# Patient Record
Sex: FEMALE | Race: WHITE | NOT HISPANIC OR LATINO | Employment: FULL TIME | URBAN - METROPOLITAN AREA
[De-identification: names, ages, dates, MRNs, and addresses within clinical notes are randomized per-mention and may not be internally consistent; named-entity substitution may affect disease eponyms.]

---

## 2023-03-16 DIAGNOSIS — J45.909 UNSPECIFIED ASTHMA, UNCOMPLICATED (HHS-HCC): ICD-10-CM

## 2023-03-16 RX ORDER — MONTELUKAST SODIUM 10 MG/1
10 TABLET ORAL EVERY EVENING
COMMUNITY
End: 2023-10-24 | Stop reason: SDUPTHER

## 2023-03-16 NOTE — TELEPHONE ENCOUNTER
Requested Prescriptions     Pending Prescriptions Disp Refills    montelukast (Singulair) 10 mg tablet [Pharmacy Med Name: MONTELUKAST SOD 10 MG TABLET] 90 tablet      Sig: TAKE 1 TABLET BY MOUTH EVERY DAY IN THE EVENING

## 2023-03-17 RX ORDER — MONTELUKAST SODIUM 10 MG/1
TABLET ORAL
Qty: 30 TABLET | Refills: 0 | Status: SHIPPED | OUTPATIENT
Start: 2023-03-17 | End: 2023-04-14

## 2023-03-21 RX ORDER — MONTELUKAST SODIUM 10 MG/1
10 TABLET ORAL EVERY EVENING
Qty: 30 TABLET | Refills: 1 | OUTPATIENT
Start: 2023-03-21 | End: 2023-04-20

## 2023-03-21 NOTE — TELEPHONE ENCOUNTER
It seems this medication was sent on 3/17/23 by Dr. Bradshaw. Please confirm with patient. If she is unable to fill that prescription, I can send in a new one. Thanks!

## 2023-04-14 DIAGNOSIS — J45.909 UNSPECIFIED ASTHMA, UNCOMPLICATED (HHS-HCC): ICD-10-CM

## 2023-04-14 RX ORDER — MONTELUKAST SODIUM 10 MG/1
TABLET ORAL
Qty: 30 TABLET | Refills: 0 | Status: SHIPPED | OUTPATIENT
Start: 2023-04-14 | End: 2023-05-08

## 2023-04-14 NOTE — TELEPHONE ENCOUNTER
Requested Prescriptions     Pending Prescriptions Disp Refills    montelukast (Singulair) 10 mg tablet [Pharmacy Med Name: MONTELUKAST SOD 10 MG TABLET] 30 tablet 0     Sig: TAKE 1 TABLET BY MOUTH EVERY DAY IN THE EVENING

## 2023-05-06 DIAGNOSIS — J45.909 UNSPECIFIED ASTHMA, UNCOMPLICATED (HHS-HCC): ICD-10-CM

## 2023-05-08 RX ORDER — MONTELUKAST SODIUM 10 MG/1
TABLET ORAL
Qty: 30 TABLET | Refills: 0 | Status: SHIPPED | OUTPATIENT
Start: 2023-05-08 | End: 2023-06-06

## 2023-06-03 DIAGNOSIS — J45.909 UNSPECIFIED ASTHMA, UNCOMPLICATED (HHS-HCC): ICD-10-CM

## 2023-06-06 RX ORDER — MONTELUKAST SODIUM 10 MG/1
TABLET ORAL
Qty: 30 TABLET | Refills: 0 | Status: SHIPPED | OUTPATIENT
Start: 2023-06-06 | End: 2023-07-10

## 2023-08-09 DIAGNOSIS — K21.9 GASTROESOPHAGEAL REFLUX DISEASE WITHOUT ESOPHAGITIS: Primary | ICD-10-CM

## 2023-08-09 RX ORDER — OMEPRAZOLE 20 MG/1
20 CAPSULE, DELAYED RELEASE ORAL DAILY
Qty: 30 CAPSULE | Refills: 1 | Status: SHIPPED | OUTPATIENT
Start: 2023-08-09 | End: 2023-09-18 | Stop reason: SDUPTHER

## 2023-08-24 ENCOUNTER — TELEPHONE (OUTPATIENT)
Dept: PRIMARY CARE | Facility: CLINIC | Age: 23
End: 2023-08-24
Payer: COMMERCIAL

## 2023-08-24 NOTE — TELEPHONE ENCOUNTER
Pt's mother called because pt was having some discomfort and hard to breathe and requested an inhaler be sent into pharmacy near them (they are in [place]). After speaking with you, i called and had to leave a message for them to call us back. Awaiting call back.

## 2023-08-29 ENCOUNTER — OFFICE VISIT (OUTPATIENT)
Dept: PRIMARY CARE | Facility: CLINIC | Age: 23
End: 2023-08-29
Payer: COMMERCIAL

## 2023-08-29 VITALS
BODY MASS INDEX: 43.58 KG/M2 | OXYGEN SATURATION: 97 % | TEMPERATURE: 98 F | HEART RATE: 112 BPM | WEIGHT: 246 LBS | RESPIRATION RATE: 18 BRPM

## 2023-08-29 DIAGNOSIS — J20.9 ACUTE BRONCHITIS, UNSPECIFIED ORGANISM: Primary | ICD-10-CM

## 2023-08-29 PROCEDURE — 99214 OFFICE O/P EST MOD 30 MIN: CPT | Performed by: FAMILY MEDICINE

## 2023-08-29 PROCEDURE — 87635 SARS-COV-2 COVID-19 AMP PRB: CPT

## 2023-08-29 RX ORDER — DEXAMETHASONE 6 MG/1
6 TABLET ORAL DAILY
Qty: 10 TABLET | Refills: 0 | Status: SHIPPED | OUTPATIENT
Start: 2023-08-29 | End: 2023-10-24 | Stop reason: ALTCHOICE

## 2023-08-29 RX ORDER — BENZONATATE 200 MG/1
200 CAPSULE ORAL 3 TIMES DAILY PRN
Qty: 30 CAPSULE | Refills: 0 | Status: SHIPPED | OUTPATIENT
Start: 2023-08-29 | End: 2023-09-08

## 2023-08-29 RX ORDER — AMOXICILLIN AND CLAVULANATE POTASSIUM 875; 125 MG/1; MG/1
875 TABLET, FILM COATED ORAL 2 TIMES DAILY
Qty: 20 TABLET | Refills: 0 | Status: SHIPPED | OUTPATIENT
Start: 2023-08-29 | End: 2023-09-08

## 2023-08-29 NOTE — PROGRESS NOTES
Subjective   Patient ID: Xochitl Banuelos is a 22 y.o. female who presents for Cough (Congestion and wheezing started on Monday last week. Was on vacation and went to an urgent care. Was put on ATB and steroid and cough suppressant but cough got worse. ).    HPI     Review of Systems    Objective   Pulse (!) 112   Temp 36.7 °C (98 °F)   Resp 18   Wt 112 kg (246 lb)   LMP 08/16/2023 (Approximate)   SpO2 97%   BMI 43.58 kg/m²     Physical Exam    Assessment/Plan

## 2023-08-29 NOTE — PROGRESS NOTES
"Subjective   Patient ID: Xochitl Banuelos is a 22 y.o. female who presents for Cough (Congestion and wheezing started on Monday last week. Was on vacation and went to an urgent care. Was put on ATB and steroid and cough suppressant but cough got worse. ).    HPI   Patient comes in today for evaluation of cough, congestion and wheezing.  She was vacationing in [place] last week and became sick.  She went to an urgent care in [place] and was given steroids, Z-Dj and Tessalon Perles.  She tested for COVID last Thursday and was negative and tested again on Sunday and was negative.  She quit vaping on Saturday.  She has completed the course of medication she was given but still is not feeling better.        1/11/2022  Patient presents today for 6-month checkup and refill of meds. She currently is without complaints. She states that she is taking her medicines as directed and is not having any side effects from them. Patient continues to do well with the medication and has lost another 4 pounds since last visit. 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    Patient has had a cough for several days.    5/17/2021  Patient presents today for 6-month checkup and refill of meds. She currently is without complaints. She states that she is taking her medicines as directed and is not having any side effects from them. The new medicine is working much better for her. She has lost 6 pounds since she was here last and is hoping to lose more.    11/11/2020  Patient comes in today for reevaluation of her medications. She would like to try changing medicines. She doesn't feel like it's helping her depression. She states she is not interested in doing anything that she usually likes. She has a feeling of hopelessness and is \"crying all the time\". All of this has been going on for the past month or so. She denies any triggering episode. She has also gained over 100 pounds in the last 3 years. We had a " "lengthy discussion about all this today.    Patient also needs an update of her Tdap for her clinicals for school.    8/7/2020  Patient comes in today for follow-up on her antidepressant medication. It is working well for her. 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 She is hoping to start clinicals here in the fall. She will be doing them at [facility] [employer]. She has gained 2 pounds since she was here in February. She really hasn't been able to exercise or do much because of the coronavirus. She is hoping to get a lot more exercise with her clinicals coming up.    2/13/2020  Patient comes in today by herself for follow-up on her depression. She states that she is \"feeling better\". Her weight has stabilized at 268 where it has been now for the past 6 weeks. That is still up 100 pounds from 2 years ago. Since she was here last we did see some lab work and pelvic ultrasound which was pretty much normal except for a slight elevation of the white count to 12,400 and slight testosterone elevation to 72.    1/2/2020  Patient comes in today with her mother for follow-up on her depression. She is feeling more sad and with mood swings. She is taking the sertraline but only 25 mg daily. She eats for comfort measures and has gained significant amount weight. She is up 100 pounds from 2 years ago. Her mother is getting her into counseling. I expressed my concern about her weight and her depression and recommended close follow-up in a month. She hasn't been here since May at which time she weighed 220 pounds. She has gained 48 pounds since then.    5/1/2019  The patient is a 18 year old female who presents with a complaint of a sore throat. The sore throat has been occurring for 1 week. There has been associated sore throat (with white spots on the back of the throat), while there has been no loss of appetite, fever, chills, ear pain, runny nose, nasal congestion, headache, cough, " wheezing, nausea, vomiting, diarrhea, body aches, hoarseness, recent contact with a person with sore throat, sinus pain, skin rash, swollen lymph glands or other. It is aggravated by swallowing. There are no relieving factors.    Additional reasons for visit:    Vaginal discharge is described as the following:  The discharge has been occurring for 1 day. The discharge has been scant and is characterized as milky.     Review of Systems   All other systems reviewed and are negative.      Objective   Pulse (!) 112   Temp 36.7 °C (98 °F)   Resp 18   Wt 112 kg (246 lb)   LMP 08/16/2023 (Approximate)   SpO2 97%   BMI 43.58 kg/m²     Physical Exam  Vitals reviewed.   Constitutional:       Appearance: She is well-developed.   HENT:      Head: Normocephalic and atraumatic.      Right Ear: Tympanic membrane, ear canal and external ear normal.      Left Ear: Tympanic membrane, ear canal and external ear normal.      Nose: Nose normal.      Mouth/Throat:      Mouth: Mucous membranes are moist.      Pharynx: Oropharynx is clear.   Eyes:      Extraocular Movements: Extraocular movements intact.      Conjunctiva/sclera: Conjunctivae normal.      Pupils: Pupils are equal, round, and reactive to light.   Cardiovascular:      Rate and Rhythm: Normal rate and regular rhythm.      Heart sounds: Normal heart sounds. No murmur heard.  Pulmonary:      Effort: Pulmonary effort is normal.      Breath sounds: Wheezing and rhonchi (Coarse scattered rhonchi bilateral lung fields with wheeze) present.   Abdominal:      General: Abdomen is flat. Bowel sounds are normal.      Palpations: Abdomen is soft.   Musculoskeletal:         General: Normal range of motion.   Skin:     General: Skin is warm and dry.   Neurological:      General: No focal deficit present.      Mental Status: She is alert and oriented to person, place, and time. Mental status is at baseline.   Psychiatric:         Mood and Affect: Mood normal.         Behavior: Behavior  normal.         Thought Content: Thought content normal.         Judgment: Judgment normal.         Assessment/Plan   Problem List Items Addressed This Visit    None  Visit Diagnoses       Acute bronchitis, unspecified organism    -  Primary    Relevant Medications    amoxicillin-pot clavulanate (Augmentin) 875-125 mg tablet    dexAMETHasone (Decadron) 6 mg tablet    benzonatate (Tessalon) 200 mg capsule    Other Relevant Orders    XR chest 2 views    Sars-CoV-2 PCR, Symptomatic (Completed)        Use meds as directed.  Return to clinic if symptoms do not improve in 10-14 days.    Should the condition worsen contact me and return here or if after hours seek further evaluation at an urgent care or in the emergency room.  Medication list reconciled.  Thank you for visiting today!      Professional services: Some of this note was completed using Dragon voice technology and sometimes the software misinterprets words. This may include unintended errors with respect to translation of words, typographical errors or grammar errors which may not have been identified prior to finalization of the chart note. Please take this into account when reading the note. Thank you.

## 2023-08-30 LAB — SARS-COV-2 RESULT: NOT DETECTED

## 2023-09-01 ENCOUNTER — TELEPHONE (OUTPATIENT)
Dept: PRIMARY CARE | Facility: CLINIC | Age: 23
End: 2023-09-01
Payer: COMMERCIAL

## 2023-09-06 DIAGNOSIS — J45.20 MILD INTERMITTENT ASTHMA, UNSPECIFIED WHETHER COMPLICATED (HHS-HCC): Primary | ICD-10-CM

## 2023-09-06 RX ORDER — ALBUTEROL SULFATE 90 UG/1
2 AEROSOL, METERED RESPIRATORY (INHALATION)
Qty: 18 G | Refills: 3 | Status: SHIPPED | OUTPATIENT
Start: 2023-09-06

## 2023-09-06 RX ORDER — ALBUTEROL SULFATE 90 UG/1
2 AEROSOL, METERED RESPIRATORY (INHALATION)
COMMUNITY
Start: 2015-11-05 | End: 2023-09-06 | Stop reason: SDUPTHER

## 2023-09-06 NOTE — TELEPHONE ENCOUNTER
Requested Prescriptions     Pending Prescriptions Disp Refills    albuterol 90 mcg/actuation inhaler 18 g 3     Sig: Inhale 2 puffs 4 times a day.

## 2023-09-08 DIAGNOSIS — J45.909 UNSPECIFIED ASTHMA, UNCOMPLICATED (HHS-HCC): ICD-10-CM

## 2023-09-08 DIAGNOSIS — K21.9 GASTROESOPHAGEAL REFLUX DISEASE WITHOUT ESOPHAGITIS: ICD-10-CM

## 2023-09-08 NOTE — TELEPHONE ENCOUNTER
Requested Prescriptions     Pending Prescriptions Disp Refills    montelukast (Singulair) 10 mg tablet [Pharmacy Med Name: MONTELUKAST SOD 10 MG TABLET] 90 tablet 1     Sig: Take 1 tablet (10 mg) by mouth once daily in the evening.    omeprazole (PriLOSEC) 20 mg DR capsule 90 capsule 1     Sig: Take 1 capsule (20 mg) by mouth once daily. Do not crush or chew.

## 2023-09-18 RX ORDER — MONTELUKAST SODIUM 10 MG/1
10 TABLET ORAL EVERY EVENING
Qty: 90 TABLET | Refills: 1 | Status: SHIPPED | OUTPATIENT
Start: 2023-09-18 | End: 2024-03-20

## 2023-09-18 RX ORDER — OMEPRAZOLE 20 MG/1
20 CAPSULE, DELAYED RELEASE ORAL DAILY
Qty: 90 CAPSULE | Refills: 1 | Status: SHIPPED | OUTPATIENT
Start: 2023-09-18 | End: 2024-03-20

## 2023-09-30 PROBLEM — F41.9 ANXIETY: Status: ACTIVE | Noted: 2023-09-30

## 2023-09-30 PROBLEM — E55.9 VITAMIN D DEFICIENCY: Status: ACTIVE | Noted: 2023-09-30

## 2023-09-30 PROBLEM — J35.8 TONSIL STONE: Status: ACTIVE | Noted: 2023-09-30

## 2023-09-30 PROBLEM — D72.829 ELEVATED WHITE BLOOD CELL COUNT: Status: ACTIVE | Noted: 2023-09-30

## 2023-09-30 PROBLEM — E03.9 HYPOTHYROIDISM: Status: ACTIVE | Noted: 2023-09-30

## 2023-09-30 PROBLEM — E66.01 MORBID OBESITY (MULTI): Status: ACTIVE | Noted: 2023-09-30

## 2023-09-30 PROBLEM — R09.A2 GLOBUS PHARYNGEUS: Status: ACTIVE | Noted: 2023-09-30

## 2023-09-30 PROBLEM — R00.2 PALPITATIONS: Status: ACTIVE | Noted: 2023-09-30

## 2023-09-30 PROBLEM — R76.11 PPD+ (PURIFIED PROTEIN DERIVATIVE POSITIVE): Status: ACTIVE | Noted: 2023-09-30

## 2023-09-30 PROBLEM — R63.5 UNEXPLAINED WEIGHT GAIN: Status: ACTIVE | Noted: 2023-09-30

## 2023-09-30 PROBLEM — K21.9 ACID REFLUX: Status: ACTIVE | Noted: 2023-09-30

## 2023-09-30 PROBLEM — R79.89 ELEVATED TSH: Status: ACTIVE | Noted: 2023-09-30

## 2023-09-30 PROBLEM — R13.10 DYSPHAGIA: Status: ACTIVE | Noted: 2023-09-30

## 2023-09-30 PROBLEM — K21.9 GERD (GASTROESOPHAGEAL REFLUX DISEASE): Status: ACTIVE | Noted: 2023-09-30

## 2023-09-30 PROBLEM — F32.A DEPRESSION: Status: ACTIVE | Noted: 2023-09-30

## 2023-09-30 PROBLEM — N64.3 BILATERAL GALACTORRHEA: Status: ACTIVE | Noted: 2023-05-31

## 2023-09-30 PROBLEM — R35.0 URINARY FREQUENCY: Status: ACTIVE | Noted: 2023-09-30

## 2023-09-30 PROBLEM — R53.83 FATIGUE: Status: ACTIVE | Noted: 2023-09-30

## 2023-09-30 PROBLEM — J45.909 ASTHMA (HHS-HCC): Status: ACTIVE | Noted: 2023-09-30

## 2023-09-30 PROBLEM — E05.90 HYPERTHYROIDISM: Status: ACTIVE | Noted: 2023-09-30

## 2023-09-30 RX ORDER — TOPIRAMATE 50 MG/1
50 TABLET, FILM COATED ORAL DAILY
COMMUNITY
Start: 2023-08-30

## 2023-09-30 RX ORDER — PROMETHAZINE HYDROCHLORIDE AND DEXTROMETHORPHAN HYDROBROMIDE 6.25; 15 MG/5ML; MG/5ML
5 SYRUP ORAL EVERY 6 HOURS PRN
COMMUNITY
Start: 2023-08-24 | End: 2023-10-24 | Stop reason: ALTCHOICE

## 2023-09-30 RX ORDER — MELOXICAM 15 MG/1
1 TABLET ORAL DAILY
COMMUNITY
Start: 2022-11-24 | End: 2023-10-24 | Stop reason: ALTCHOICE

## 2023-09-30 RX ORDER — GUAIFENESIN, PSEUDOEPHEDRINE HYDROCHLORIDE 600; 60 MG/1; MG/1
1-2 TABLET, EXTENDED RELEASE ORAL EVERY 12 HOURS PRN
COMMUNITY
Start: 2023-08-24 | End: 2023-10-24 | Stop reason: ALTCHOICE

## 2023-09-30 RX ORDER — ERGOCALCIFEROL 1.25 MG/1
1 CAPSULE ORAL
COMMUNITY
Start: 2023-04-17 | End: 2023-10-24 | Stop reason: ALTCHOICE

## 2023-09-30 RX ORDER — NORGESTIMATE AND ETHINYL ESTRADIOL 7DAYSX3 28
1 KIT ORAL DAILY
COMMUNITY
Start: 2020-04-14 | End: 2023-10-24 | Stop reason: ALTCHOICE

## 2023-09-30 RX ORDER — PHENTERMINE HYDROCHLORIDE 37.5 MG/1
37.5 TABLET ORAL DAILY
COMMUNITY
Start: 2023-05-22 | End: 2023-10-24 | Stop reason: ALTCHOICE

## 2023-09-30 RX ORDER — DOCUSATE SODIUM 100 MG/1
100 CAPSULE, LIQUID FILLED ORAL 2 TIMES DAILY PRN
COMMUNITY
Start: 2023-04-20 | End: 2023-10-24 | Stop reason: ALTCHOICE

## 2023-09-30 RX ORDER — FLUOXETINE HYDROCHLORIDE 20 MG/1
1 CAPSULE ORAL EVERY MORNING
COMMUNITY
Start: 2020-12-06 | End: 2023-10-24 | Stop reason: ALTCHOICE

## 2023-09-30 RX ORDER — METFORMIN HYDROCHLORIDE 500 MG/1
1 TABLET ORAL 2 TIMES DAILY
COMMUNITY
Start: 2023-02-06 | End: 2023-10-24 | Stop reason: ALTCHOICE

## 2023-09-30 RX ORDER — CHOLECALCIFEROL (VITAMIN D3) 25 MCG
25 TABLET ORAL DAILY
COMMUNITY
Start: 2023-05-19 | End: 2023-11-15

## 2023-10-03 ENCOUNTER — DOCUMENTATION (OUTPATIENT)
Dept: PULMONOLOGY | Facility: CLINIC | Age: 23
End: 2023-10-03
Payer: COMMERCIAL

## 2023-10-03 ENCOUNTER — TELEPHONE (OUTPATIENT)
Dept: PULMONOLOGY | Facility: CLINIC | Age: 23
End: 2023-10-03
Payer: COMMERCIAL

## 2023-10-03 NOTE — TELEPHONE ENCOUNTER
Name: Xochitl Banuelos  MRN: [id number]  YOB: 2000    Date: 10/03/23     Reason for Visit:  Reschedule appointment     Appointment scheduled for 10/5/23 with Best Bronson CNP  Needs to be rescheduled to a different provider or a different location for Best.

## 2023-10-05 ENCOUNTER — APPOINTMENT (OUTPATIENT)
Dept: PULMONOLOGY | Facility: HOSPITAL | Age: 23
End: 2023-10-05
Payer: COMMERCIAL

## 2023-10-24 ENCOUNTER — OFFICE VISIT (OUTPATIENT)
Dept: PULMONOLOGY | Facility: CLINIC | Age: 23
End: 2023-10-24
Payer: COMMERCIAL

## 2023-10-24 VITALS
SYSTOLIC BLOOD PRESSURE: 125 MMHG | TEMPERATURE: 98.1 F | OXYGEN SATURATION: 99 % | HEIGHT: 63 IN | HEART RATE: 97 BPM | WEIGHT: 264 LBS | BODY MASS INDEX: 46.78 KG/M2 | DIASTOLIC BLOOD PRESSURE: 91 MMHG

## 2023-10-24 DIAGNOSIS — R06.83 SNORING: ICD-10-CM

## 2023-10-24 DIAGNOSIS — J45.909 ASTHMA, UNSPECIFIED ASTHMA SEVERITY, UNSPECIFIED WHETHER COMPLICATED, UNSPECIFIED WHETHER PERSISTENT (HHS-HCC): Primary | ICD-10-CM

## 2023-10-24 DIAGNOSIS — J30.9 ALLERGIC RHINITIS, UNSPECIFIED SEASONALITY, UNSPECIFIED TRIGGER: ICD-10-CM

## 2023-10-24 DIAGNOSIS — R06.02 SHORTNESS OF BREATH: ICD-10-CM

## 2023-10-24 PROCEDURE — 99406 BEHAV CHNG SMOKING 3-10 MIN: CPT | Performed by: NURSE PRACTITIONER

## 2023-10-24 PROCEDURE — 99204 OFFICE O/P NEW MOD 45 MIN: CPT | Performed by: NURSE PRACTITIONER

## 2023-10-24 PROCEDURE — 99214 OFFICE O/P EST MOD 30 MIN: CPT | Performed by: NURSE PRACTITIONER

## 2023-10-24 RX ORDER — LORATADINE 10 MG/1
10 TABLET ORAL DAILY
Qty: 30 TABLET | Refills: 3 | Status: SHIPPED | OUTPATIENT
Start: 2023-10-24 | End: 2024-03-06

## 2023-10-24 RX ORDER — BUDESONIDE AND FORMOTEROL FUMARATE DIHYDRATE 80; 4.5 UG/1; UG/1
2 AEROSOL RESPIRATORY (INHALATION)
Qty: 1 EACH | Refills: 3 | Status: SHIPPED | OUTPATIENT
Start: 2023-10-24 | End: 2024-02-13 | Stop reason: ALTCHOICE

## 2023-10-24 ASSESSMENT — ENCOUNTER SYMPTOMS
SINUS PRESSURE: 0
BACK PAIN: 0
NUMBNESS: 0
ABDOMINAL PAIN: 0
VOICE CHANGE: 0
HEADACHES: 0
DIZZINESS: 0
NERVOUS/ANXIOUS: 0
EYE PAIN: 0
FEVER: 0
DIARRHEA: 0
ARTHRALGIAS: 0
JOINT SWELLING: 0
PALPITATIONS: 0
NAUSEA: 0
AGITATION: 0
WEAKNESS: 0
DEPRESSION: 0
VOMITING: 0
MYALGIAS: 0
FATIGUE: 1
RHINORRHEA: 0

## 2023-10-24 NOTE — PATIENT INSTRUCTIONS
Asthma:   - will get baseline PFTs - henry pre/post and FENO   - continue albuterol 2 puffs or albuterol nebulizers every 4-6 hours as needed for shortness of breath   - start symbicort 80/4.5 2 puffs twice daily - rinse mouth out afterwards     Allergic rhinitis:   - continue montelukast (singulair) 10mg daily at bedtime   - start loratadine (claritin) 10mg daily --> wait 2 weeks     Snoring:   - sleep referral      Smoking cessation: has not smoked for 3 days -  - keep up the good work!   - try using suckers/ hard candy instead of your vape   > 5 minutes smoking cessation counseling     Thank you for visiting the Pulmonary clinic today!   Return to clinic  2-3 months after PFTs or sooner if needed   Camelia Brown CNP  My office number is [phone]  Gretta is my  and Janae is my nurse.   Radiology scheduling [phone]   Appointment scheduling [phone]

## 2023-10-24 NOTE — PROGRESS NOTES
Patient: Xochitl Banuelos    [id number]  : 2000 -- AGE 22 y.o.    Provider: MUNDO Parham     Location [facility]   Service Date: 10/24/2023              [facility]  New Visit Note      HISTORY OF PRESENT ILLNESS     The patient's referring provider is: Ronald Bradshaw DO    HISTORY OF PRESENT ILLNESS   Xochitl Banuelos is a 22 y.o. female (current smoker) who presents to a [facility] for an evaluation with concerns of Asthma. I have independently interviewed and examined the patient in the office and reviewed available records.    Current History    She had an episode of bronchitis in August. She was on a prednisone and a z pack prior that did not work. She then was on augmentin and dexamethasone with little improvement. She feels the dexamethasone made her not feel well and she was not sleeping. She tends to get an episode of bronchitis once a year. She has had pneumonia in the past, but when she was really little. She is concerned because she continues to have residual symptoms from her bronchitis in August.  She took 3 COVID tests which were all negative. She had COVID twice - didn't have chest issues - more so just body aches, and cold symptoms.      She states before this bronchitis episode she rarely needed her albuterol. She swam in high school and even then would rarely use her rescue. She states her nebulizer is new with this episode of bronchitis.  She feels the albuterol HFA and the nebulizers don't help a whole lot.     She still has a cough - dry and barky. Se states she will hear mucous in her chest - not sure if its a wheeze. She has been getting back to the gym. She feels her exercise endurance is not where it was, but is gradually improving. She was working out 6 days a week prior to her bronchitis. She rarely will have SOB at rest. She denies any CP. She is on montelukast daily  (has since childhood). She does have some post nasal drip, but denies any nasal congestion or runny nose. She has good control of her GERD with daily omeprazole.     ACT today 17     Previous pulmonary history: She was previously told she has asthma.    Inhalers/nebulized medications: albuterol     Hospitalization History: She has not been hospitalized over the last year for breathing related problem.    Sleep history: She has been told she snores. Possible witnessed apneas. She does not wake up feeling rested.      ALLERGIES AND MEDICATIONS     ALLERGIES  No Known Allergies    MEDICATIONS  Current Outpatient Medications   Medication Sig Dispense Refill    albuterol 90 mcg/actuation inhaler Inhale 2 puffs 4 times a day. (Patient taking differently: Inhale 1-2 puffs. Every 4 to 6 hours as needed as directed.) 18 g 3    cholecalciferol (Vitamin D3) 25 MCG (1000 UT) tablet Take 1 tablet (25 mcg) by mouth once daily.      montelukast (Singulair) 10 mg tablet Take 1 tablet (10 mg) by mouth once daily in the evening. 90 tablet 1    omeprazole (PriLOSEC) 20 mg DR capsule Take 1 capsule (20 mg) by mouth once daily. Do not crush or chew. 90 capsule 1    topiramate (Topamax) 50 mg tablet Take 1 tablet (50 mg) by mouth once daily.       No current facility-administered medications for this visit.         PAST HISTORY     PAST MEDICAL HISTORY  - asthma   - GERD     PAST SURGICAL HISTORY  Past Surgical History:   Procedure Laterality Date    OTHER SURGICAL HISTORY  10/27/2016    Oral Surgery       IMMUNIZATION HISTORY  Immunization History   Administered Date(s) Administered    DTaP vaccine, pediatric  (INFANRIX) 02/01/1999, 04/13/1999, 06/07/1999, 03/20/2000, 12/02/2003    Flu vaccine (IIV4), preservative free *Check age/dose* 11/06/2014, 01/16/2017, 09/05/2018    HPV 9-valent vaccine (GARDASIL 9) 09/17/2015    HPV, Quadrivalent 10/25/2013, 11/06/2014    Hepatitis B vaccine, adult (RECOMBIVAX, ENGERIX) 04/23/2021    Hepatitis B  vaccine, pediatric/adolescent (RECOMBIVAX, ENGERIX) 1998    HiB, unspecified 1999    Hib / Hep B 1999, 1999    Influenza, Unspecified 10/30/2003, 2003, 2005, 2006, 2007, 2008, 2009, 2010, 2011, 12/10/2012, 10/25/2013    Influenza, injectable, MDCK, preservative free, quadrivalent 2022    Influenza, injectable, MDCK, quadrivalent 2019    Influenza, injectable, quadrivalent 2021    Influenza, seasonal, injectable 2015    MMR vaccine, subcutaneous (MMR II) 1999, 2002, 2018, 2021    Meningococcal ACWY, unspecified 2015    Meningococcal MCV4, Unspecified 2010    Meningococcal MCV4P 2015    Moderna SARS-CoV-2 Vaccination 2021, 2021    Novel influenza-H1N1-09, preservative-free 2009, 2020    Pfizer Purple Cap SARS-CoV-2 2022    Poliovirus vaccine, subcutaneous (IPOL) 1999, 1999, 2000, 2003    Rotavirus pentavalent vaccine, oral (ROTATEQ) 1999, 1999    Tdap vaccine, age 7 year and older (BOOSTRIX) 2010, 2020, 2021    Varicella vaccine, subcutaneous (VARIVAX) 2000, 2007       SOCIAL HISTORY  Smokin-24 cigarettes 1/3 ppd-> vape . Most recently vaping. On and off - none this week so far   ETOH: 10 drinks a week   Illicit drugs: none     OCCUPATIONAL/ENVIRONMENTAL HISTORY  Currently works as an [employer]     FAMILY HISTORY  Family History   Problem Relation Name Age of Onset    Hypertension Mother      Hypothyroidism Other       Asthma - paternal aunt   BECKI - mother (before weight loss surgery)   Lung cancer - uncle     RESULTS/DATA     Pulmonary Function Test Results       None on record     Chest Radiograph     XR chest 2 views 2023- Impression  No radiographic evidence of an acute cardiopulmonary process.       Chest CT Scan     None on record        Echocardiogram     None  "on record        Other testing/ Labs      REVIEW OF SYSTEMS     REVIEW OF SYSTEMS  Review of Systems   Constitutional:  Positive for fatigue. Negative for fever.   HENT:  Negative for congestion, postnasal drip, rhinorrhea, sinus pressure and voice change.    Eyes:  Negative for pain and visual disturbance.   Cardiovascular:  Negative for chest pain, palpitations and leg swelling.   Gastrointestinal:  Negative for abdominal pain, diarrhea, nausea and vomiting.   Endocrine: Negative for cold intolerance and heat intolerance.   Musculoskeletal:  Negative for arthralgias, back pain, joint swelling and myalgias.   Skin:  Negative for rash.   Neurological:  Negative for dizziness, weakness, numbness and headaches.   Psychiatric/Behavioral:  Negative for agitation. The patient is not nervous/anxious.          PHYSICAL EXAM     VITAL SIGNS: BP (!) 125/91   Pulse 97   Temp 36.7 °C (98.1 °F)   Ht 1.6 m (5' 3\")   Wt 120 kg (264 lb)   SpO2 99%   BMI 46.77 kg/m²      CURRENT WEIGHT: [unfilled]  BMI: [unfilled]  PREVIOUS WEIGHTS:  Wt Readings from Last 3 Encounters:   10/24/23 120 kg (264 lb)   08/29/23 112 kg (246 lb)   12/16/22 126 kg (278 lb)       Physical Exam  Vitals reviewed.   Constitutional:       General: She is not in acute distress.     Appearance: Normal appearance. She is not ill-appearing or toxic-appearing.   HENT:      Head: Normocephalic.      Nose: No rhinorrhea.   Cardiovascular:      Rate and Rhythm: Normal rate and regular rhythm.      Heart sounds: Normal heart sounds.   Pulmonary:      Effort: Pulmonary effort is normal. No respiratory distress.      Breath sounds: Normal breath sounds. No stridor.   Abdominal:      General: Abdomen is flat.   Musculoskeletal:         General: No swelling. Normal range of motion.   Skin:     General: Skin is warm and dry.      Nails: There is no clubbing.   Neurological:      General: No focal deficit present.      Mental Status: She is alert.   Psychiatric:         Mood " and Affect: Mood normal.         Behavior: Behavior normal.         Judgment: Judgment normal.         ASSESSMENT/PLAN     Asthma:   - will get baseline PFTs - henry pre/post and FENO   - continue albuterol 2 puffs or albuterol nebulizers every 4-6 hours as needed for shortness of breath   - start symbicort 80/4.5 2 puffs twice daily - rinse mouth out afterwards     Allergic rhinitis:   - continue montelukast (singulair) 10mg daily at bedtime   - start loratadine (claritin) 10mg daily --> wait 2 weeks     Snoring:   - sleep referral      Smoking cessation: has not smoked for 3 days -  - keep up the good work!   - try using suckers/ hard candy instead of your vape   > 5 minutes smoking cessation counseling

## 2023-11-02 ENCOUNTER — APPOINTMENT (OUTPATIENT)
Dept: PULMONOLOGY | Facility: HOSPITAL | Age: 23
End: 2023-11-02
Payer: COMMERCIAL

## 2023-11-07 ENCOUNTER — HOSPITAL ENCOUNTER (OUTPATIENT)
Dept: RESPIRATORY THERAPY | Facility: HOSPITAL | Age: 23
End: 2023-11-07
Payer: COMMERCIAL

## 2023-11-16 ENCOUNTER — HOSPITAL ENCOUNTER (OUTPATIENT)
Dept: RESPIRATORY THERAPY | Facility: HOSPITAL | Age: 23
Discharge: HOME | End: 2023-11-16
Payer: COMMERCIAL

## 2023-11-16 DIAGNOSIS — J45.909 ASTHMA, UNSPECIFIED ASTHMA SEVERITY, UNSPECIFIED WHETHER COMPLICATED, UNSPECIFIED WHETHER PERSISTENT (HHS-HCC): ICD-10-CM

## 2023-11-16 PROCEDURE — 95012 NITRIC OXIDE EXP GAS DETER: CPT | Performed by: INTERNAL MEDICINE

## 2023-11-16 PROCEDURE — 94010 BREATHING CAPACITY TEST: CPT | Performed by: INTERNAL MEDICINE

## 2023-11-16 PROCEDURE — 94010 BREATHING CAPACITY TEST: CPT

## 2023-11-27 LAB
MGC ASCENT PFT - FEV1 - PRE: 3.98
MGC ASCENT PFT - FEV1 - PREDICTED: 2.97
MGC ASCENT PFT - FVC - PRE: 5.48
MGC ASCENT PFT - FVC - PREDICTED: 3.4

## 2024-02-13 ENCOUNTER — OFFICE VISIT (OUTPATIENT)
Dept: PULMONOLOGY | Facility: CLINIC | Age: 24
End: 2024-02-13
Payer: COMMERCIAL

## 2024-02-13 VITALS
OXYGEN SATURATION: 99 % | BODY MASS INDEX: 46.07 KG/M2 | WEIGHT: 260 LBS | SYSTOLIC BLOOD PRESSURE: 150 MMHG | HEART RATE: 109 BPM | DIASTOLIC BLOOD PRESSURE: 83 MMHG | HEIGHT: 63 IN | TEMPERATURE: 98.1 F

## 2024-02-13 DIAGNOSIS — J45.909 ASTHMA, UNSPECIFIED ASTHMA SEVERITY, UNSPECIFIED WHETHER COMPLICATED, UNSPECIFIED WHETHER PERSISTENT (HHS-HCC): Primary | ICD-10-CM

## 2024-02-13 DIAGNOSIS — J30.9 ALLERGIC RHINITIS, UNSPECIFIED SEASONALITY, UNSPECIFIED TRIGGER: ICD-10-CM

## 2024-02-13 DIAGNOSIS — R06.02 SHORTNESS OF BREATH: ICD-10-CM

## 2024-02-13 PROCEDURE — 99213 OFFICE O/P EST LOW 20 MIN: CPT | Performed by: NURSE PRACTITIONER

## 2024-02-13 RX ORDER — BUDESONIDE AND FORMOTEROL FUMARATE DIHYDRATE 160; 4.5 UG/1; UG/1
2 AEROSOL RESPIRATORY (INHALATION) 2 TIMES DAILY
Qty: 10.2 G | Refills: 3 | Status: SHIPPED | OUTPATIENT
Start: 2024-02-13

## 2024-02-13 ASSESSMENT — ENCOUNTER SYMPTOMS
SINUS PRESSURE: 0
NAUSEA: 0
JOINT SWELLING: 0
FEVER: 0
DIARRHEA: 0
WEAKNESS: 0
FATIGUE: 0
VOICE CHANGE: 0
ABDOMINAL PAIN: 0
NERVOUS/ANXIOUS: 0
ARTHRALGIAS: 0
RHINORRHEA: 0
VOMITING: 0
NUMBNESS: 0
PALPITATIONS: 0
BACK PAIN: 0
DIZZINESS: 0
MYALGIAS: 0
AGITATION: 0
EYE PAIN: 0
HEADACHES: 0

## 2024-02-13 NOTE — PROGRESS NOTES
Patient: Xochitl Banuelos    [id number]  : 2000 -- AGE 23 y.o.    Provider: MUNDO Parham     Location [facility]   Service Date: 2024              [facility]  Follow up Visit Note      HISTORY OF PRESENT ILLNESS     The patient's referring provider is: No ref. provider found    HISTORY OF PRESENT ILLNESS   Xochitl Banuelos is a 23 y.o. female (current smoker) who presents to a [facility] for an evaluation with concerns of Follow-up (Results of testing). I have independently interviewed and examined the patient in the office and reviewed available records.    Current History    Since last visit she states she has been doing better overall. She was able to get the symbicort and feels it has been helping. Cough is still there, but better as well. She states its worse at night when she lies down.  She has been using her symbicort BID. She states her cough is dry/ barky. She as not been needing her albuterol often - a few times a week. She denies any wheezing. She feels her exercise endurance is a little better on the symbicort. She denies any SOB at rest or CP. She started the loratadine, but is not sure if it has done much. She still feels she is having post nasal drip. She has been taking her montelukast daily. She feels her GERD is under good control with daily omeprazole. She is back to vaping - she is off and on with it.     ACT today 21    10/24/23: She had an episode of bronchitis in August. She was on a prednisone and a z pack prior that did not work. She then was on augmentin and dexamethasone with little improvement. She feels the dexamethasone made her not feel well and she was not sleeping. She tends to get an episode of bronchitis once a year. She has had pneumonia in the past, but when she was really little. She is concerned because she continues to have residual symptoms from her  bronchitis in August.  She took 3 COVID tests which were all negative. She had COVID twice - didn't have chest issues - more so just body aches, and cold symptoms.    -- She states before this bronchitis episode she rarely needed her albuterol. She swam in high school and even then would rarely use her rescue. She states her nebulizer is new with this episode of bronchitis.  She feels the albuterol HFA and the nebulizers don't help a whole lot.   -- She still has a cough - dry and barky. Seh states she will hear mucous in her chest - not sure if its a wheeze. She has been getting back to the gym. She feels her exercise endurance is not where it was, but is gradually improving. She was working out 6 days a week prior to her bronchitis. She rarely will have SOB at rest. She denies any CP. She is on montelukast daily (has since childhood). She does have some post nasal drip, but denies any nasal congestion or runny nose. She has good control of her GERD with daily omeprazole.   ACT today 17     Previous pulmonary history: She was previously told she has asthma.    Inhalers/nebulized medications: albuterol     Hospitalization History: She has not been hospitalized over the last year for breathing related problem.    Sleep history: She has been told she snores. Possible witnessed apneas. She does not wake up feeling rested.      ALLERGIES AND MEDICATIONS     ALLERGIES  No Known Allergies    MEDICATIONS  Current Outpatient Medications   Medication Sig Dispense Refill    albuterol 90 mcg/actuation inhaler Inhale 2 puffs 4 times a day. (Patient taking differently: Inhale 1-2 puffs. Every 4 to 6 hours as needed as directed.) 18 g 3    budesonide-formoteroL (Symbicort) 80-4.5 mcg/actuation inhaler Inhale 2 puffs 2 times a day. Rinse mouth with water after use to reduce aftertaste and incidence of candidiasis. Do not swallow. 1 each 3    loratadine (Claritin) 10 mg tablet Take 1 tablet (10 mg) by mouth once daily. Take in the  evening before bedtime 30 tablet 3    montelukast (Singulair) 10 mg tablet Take 1 tablet (10 mg) by mouth once daily in the evening. 90 tablet 1    omeprazole (PriLOSEC) 20 mg DR capsule Take 1 capsule (20 mg) by mouth once daily. Do not crush or chew. 90 capsule 1    topiramate (Topamax) 50 mg tablet Take 1 tablet (50 mg) by mouth once daily.       No current facility-administered medications for this visit.         PAST HISTORY     PAST MEDICAL HISTORY  - asthma   - GERD     PAST SURGICAL HISTORY  Past Surgical History:   Procedure Laterality Date    OTHER SURGICAL HISTORY  10/27/2016    Oral Surgery       IMMUNIZATION HISTORY  Immunization History   Administered Date(s) Administered    DTaP vaccine, pediatric  (INFANRIX) 02/01/1999, 04/13/1999, 06/07/1999, 03/20/2000, 12/02/2003    Flu vaccine (IIV4), preservative free *Check age/dose* 11/06/2014, 01/16/2017, 09/05/2018    Flu vaccine, quadrivalent, no egg protein, age 6 month or greater (FLUCELVAX) 09/27/2022    HPV 9-valent vaccine (GARDASIL 9) 09/17/2015    HPV, Quadrivalent 10/25/2013, 11/06/2014    Hepatitis B vaccine, adult (RECOMBIVAX, ENGERIX) 04/23/2021    Hepatitis B vaccine, pediatric/adolescent (RECOMBIVAX, ENGERIX) 2000    HiB, unspecified 04/13/1999    Hib / Hep B 02/01/1999, 12/06/1999    Influenza, Unspecified 10/30/2003, 12/02/2003, 12/06/2005, 12/07/2006, 12/13/2007, 12/16/2008, 09/21/2009, 12/02/2010, 12/01/2011, 12/10/2012, 10/25/2013    Influenza, injectable, MDCK, quadrivalent 09/24/2019    Influenza, injectable, quadrivalent 09/30/2021    Influenza, seasonal, injectable 11/05/2015    MMR vaccine, subcutaneous (MMR II) 12/06/1999, 12/16/2002, 07/27/2018, 04/19/2021    Meningococcal ACWY, unspecified 11/05/2015    Meningococcal MCV4, Unspecified 12/02/2010    Meningococcal MCV4P 11/05/2015    Moderna SARS-CoV-2 Vaccination 01/22/2021, 02/16/2021    Novel influenza-H1N1-09, preservative-free 12/18/2009, 09/14/2020    Pfizer Purple Cap  SARS-CoV-2 2022    Poliovirus vaccine, subcutaneous (IPOL) 1999, 1999, 2000, 2003    Rotavirus pentavalent vaccine, oral (ROTATEQ) 1999, 1999    Tdap vaccine, age 7 year and older (BOOSTRIX, ADACEL) 2010, 2020, 2021    Varicella vaccine, subcutaneous (VARIVAX) 2000, 2007       SOCIAL HISTORY  Smokin-24 cigarettes 1/3 ppd-> vape . Most recently vaping. On and off - none this week so far   ETOH: 10 drinks a week   Illicit drugs: none     OCCUPATIONAL/ENVIRONMENTAL HISTORY  Currently works as an "[employer]"     FAMILY HISTORY  Family History   Problem Relation Name Age of Onset    Hypertension Mother      Hypothyroidism Other       Asthma - paternal aunt   BECKI - mother (before weight loss surgery)   Lung cancer - uncle     RESULTS/DATA     Pulmonary Function Test Results     23 - FEV1/FVC 0.73/ FEV1 3.98 (133%)/ FVC 5.48 (161%)    FENO 23 - 11ppb     Chest Radiograph     XR chest 2 views 2023- Impression  No radiographic evidence of an acute cardiopulmonary process.       Chest CT Scan     None on record        Echocardiogram     None on record        Other testing/ Labs      REVIEW OF SYSTEMS     REVIEW OF SYSTEMS  Review of Systems   Constitutional:  Negative for fatigue and fever.   HENT:  Negative for congestion, postnasal drip, rhinorrhea, sinus pressure and voice change.    Eyes:  Negative for pain and visual disturbance.   Cardiovascular:  Negative for chest pain, palpitations and leg swelling.   Gastrointestinal:  Negative for abdominal pain, diarrhea, nausea and vomiting.   Endocrine: Negative for cold intolerance and heat intolerance.   Musculoskeletal:  Negative for arthralgias, back pain, joint swelling and myalgias.   Skin:  Negative for rash.   Neurological:  Negative for dizziness, weakness, numbness and headaches.   Psychiatric/Behavioral:  Negative for agitation. The patient is not nervous/anxious.   "        PHYSICAL EXAM     VITAL SIGNS: /83 (BP Location: Left arm, Patient Position: Sitting, BP Cuff Size: Large adult)   Pulse 109   Temp 36.7 °C (98.1 °F)   Ht 1.6 m (5' 3\")   Wt 118 kg (260 lb)   SpO2 99%   BMI 46.06 kg/m²      CURRENT WEIGHT: [unfilled]  BMI: [unfilled]  PREVIOUS WEIGHTS:  Wt Readings from Last 3 Encounters:   02/13/24 118 kg (260 lb)   10/24/23 120 kg (264 lb)   08/29/23 112 kg (246 lb)       Physical Exam  Vitals reviewed.   Constitutional:       General: She is not in acute distress.     Appearance: Normal appearance. She is not ill-appearing or toxic-appearing.   HENT:      Head: Normocephalic.      Nose: No rhinorrhea.   Cardiovascular:      Rate and Rhythm: Normal rate and regular rhythm.      Heart sounds: Normal heart sounds.   Pulmonary:      Effort: Pulmonary effort is normal. No respiratory distress.      Breath sounds: Normal breath sounds. No stridor.      Comments: Slightly coarse  Abdominal:      General: Abdomen is flat.   Musculoskeletal:         General: Normal range of motion.      Right lower leg: No edema.      Left lower leg: No edema.   Skin:     General: Skin is warm and dry.      Nails: There is no clubbing.   Neurological:      General: No focal deficit present.      Mental Status: She is alert and oriented to person, place, and time.   Psychiatric:         Mood and Affect: Mood normal.         Behavior: Behavior normal.         Judgment: Judgment normal.         ASSESSMENT/PLAN     Asthma: PFTs with mild obstruction (0.73<0.77)- FEV1 133%   - continue albuterol 2 puffs or albuterol nebulizers every 4-6 hours as needed for shortness of breath   - continue symbicort -- will increase dose to 160/4.5 2 puffs twice daily - rinse mouth out afterwards     Allergic rhinitis:   - continue montelukast (singulair) 10mg daily at bedtime   - continue loratadine (claritin) 10mg daily --> wait 2 weeks     Snoring:   - sleep referral  - upcoming appt     Smoking cessation: " still intermittently vaping   - keep up the good work!   - try using suckers/ hard candy instead of your vape   > 5 minutes smoking cessation counseling     Thank you for visiting the Pulmonary clinic today!   Return to clinic 3 months  or sooner if needed   Camelia Brown CNP  My office -  [phone]- Gretta is my  and Janae is my nurse.   Radiology scheduling [phone]   Appointment scheduling [phone]   Pulmonary function testing - [phone]

## 2024-02-13 NOTE — PATIENT INSTRUCTIONS
Asthma: PFTs with mild obstruction - FEV1 133%   - continue albuterol 2 puffs or albuterol nebulizers every 4-6 hours as needed for shortness of breath   - continue symbicort -- will increase dose to 160/4.5 2 puffs twice daily - rinse mouth out afterwards     Allergic rhinitis:   - continue montelukast (singulair) 10mg daily at bedtime   - continue loratadine (claritin) 10mg daily --> wait 2 weeks     Snoring:   - sleep referral  - upcoming appt     Smoking cessation: still intermittently vaping   - keep up the good work!   - try using suckers/ hard candy instead of your vape   > 5 minutes smoking cessation counseling     Thank you for visiting the Pulmonary clinic today!   Return to clinic 3 months  or sooner if needed   Camelia Brown CNP  My office -  [phone]- Gretta is my  and Janae is my nurse.   Radiology scheduling [phone]   Appointment scheduling [phone]   Pulmonary function testing - [phone]

## 2024-02-20 DIAGNOSIS — J30.9 ALLERGIC RHINITIS, UNSPECIFIED SEASONALITY, UNSPECIFIED TRIGGER: ICD-10-CM

## 2024-02-23 ENCOUNTER — LAB (OUTPATIENT)
Dept: LAB | Facility: LAB | Age: 24
End: 2024-02-23
Payer: COMMERCIAL

## 2024-02-23 ENCOUNTER — OFFICE VISIT (OUTPATIENT)
Dept: PRIMARY CARE | Facility: CLINIC | Age: 24
End: 2024-02-23
Payer: COMMERCIAL

## 2024-02-23 VITALS
HEART RATE: 104 BPM | WEIGHT: 258.2 LBS | RESPIRATION RATE: 18 BRPM | DIASTOLIC BLOOD PRESSURE: 87 MMHG | TEMPERATURE: 97.8 F | HEIGHT: 63 IN | SYSTOLIC BLOOD PRESSURE: 125 MMHG | BODY MASS INDEX: 45.75 KG/M2 | OXYGEN SATURATION: 99 %

## 2024-02-23 DIAGNOSIS — E53.8 VITAMIN B12 DEFICIENCY: ICD-10-CM

## 2024-02-23 DIAGNOSIS — Z13.220 LIPID SCREENING: ICD-10-CM

## 2024-02-23 DIAGNOSIS — E55.9 VITAMIN D DEFICIENCY: ICD-10-CM

## 2024-02-23 DIAGNOSIS — E66.01 MORBID OBESITY WITH BMI OF 45.0-49.9, ADULT (MULTI): ICD-10-CM

## 2024-02-23 DIAGNOSIS — E03.9 HYPOTHYROIDISM, UNSPECIFIED TYPE: ICD-10-CM

## 2024-02-23 DIAGNOSIS — E78.5 HYPERLIPIDEMIA, UNSPECIFIED HYPERLIPIDEMIA TYPE: ICD-10-CM

## 2024-02-23 DIAGNOSIS — B37.0 THRUSH: Primary | ICD-10-CM

## 2024-02-23 LAB
25(OH)D3 SERPL-MCNC: 21 NG/ML (ref 30–100)
ALBUMIN SERPL BCP-MCNC: 4.2 G/DL (ref 3.4–5)
ALP SERPL-CCNC: 70 U/L (ref 33–110)
ALT SERPL W P-5'-P-CCNC: 23 U/L (ref 7–45)
ANION GAP SERPL CALC-SCNC: 12 MMOL/L (ref 10–20)
AST SERPL W P-5'-P-CCNC: 17 U/L (ref 9–39)
BILIRUB SERPL-MCNC: 0.7 MG/DL (ref 0–1.2)
BUN SERPL-MCNC: 9 MG/DL (ref 6–23)
CALCIUM SERPL-MCNC: 9.4 MG/DL (ref 8.6–10.3)
CHLORIDE SERPL-SCNC: 107 MMOL/L (ref 98–107)
CHOLEST SERPL-MCNC: 144 MG/DL (ref 0–199)
CHOLESTEROL/HDL RATIO: 4.2
CO2 SERPL-SCNC: 23 MMOL/L (ref 21–32)
CREAT SERPL-MCNC: 0.67 MG/DL (ref 0.5–1.05)
EGFRCR SERPLBLD CKD-EPI 2021: >90 ML/MIN/1.73M*2
ERYTHROCYTE [DISTWIDTH] IN BLOOD BY AUTOMATED COUNT: 14.6 % (ref 11.5–14.5)
GLUCOSE SERPL-MCNC: 79 MG/DL (ref 74–99)
HCT VFR BLD AUTO: 37.8 % (ref 36–46)
HDLC SERPL-MCNC: 34.3 MG/DL
HGB BLD-MCNC: 12.5 G/DL (ref 12–16)
LDLC SERPL CALC-MCNC: 86 MG/DL
MCH RBC QN AUTO: 27.7 PG (ref 26–34)
MCHC RBC AUTO-ENTMCNC: 33.1 G/DL (ref 32–36)
MCV RBC AUTO: 84 FL (ref 80–100)
NON HDL CHOLESTEROL: 110 MG/DL (ref 0–149)
NRBC BLD-RTO: 0 /100 WBCS (ref 0–0)
PLATELET # BLD AUTO: 319 X10*3/UL (ref 150–450)
POTASSIUM SERPL-SCNC: 4.2 MMOL/L (ref 3.5–5.3)
PROT SERPL-MCNC: 6.5 G/DL (ref 6.4–8.2)
RBC # BLD AUTO: 4.51 X10*6/UL (ref 4–5.2)
SODIUM SERPL-SCNC: 138 MMOL/L (ref 136–145)
T4 FREE SERPL-MCNC: 1.1 NG/DL (ref 0.61–1.12)
TRIGL SERPL-MCNC: 117 MG/DL (ref 0–149)
TSH SERPL-ACNC: 4.7 MIU/L (ref 0.44–3.98)
VIT B12 SERPL-MCNC: 295 PG/ML (ref 211–911)
VLDL: 23 MG/DL (ref 0–40)
WBC # BLD AUTO: 10.8 X10*3/UL (ref 4.4–11.3)

## 2024-02-23 PROCEDURE — 84439 ASSAY OF FREE THYROXINE: CPT

## 2024-02-23 PROCEDURE — 99213 OFFICE O/P EST LOW 20 MIN: CPT | Performed by: FAMILY MEDICINE

## 2024-02-23 PROCEDURE — 80053 COMPREHEN METABOLIC PANEL: CPT

## 2024-02-23 PROCEDURE — 80061 LIPID PANEL: CPT

## 2024-02-23 PROCEDURE — 36415 COLL VENOUS BLD VENIPUNCTURE: CPT

## 2024-02-23 PROCEDURE — 85027 COMPLETE CBC AUTOMATED: CPT

## 2024-02-23 PROCEDURE — 82306 VITAMIN D 25 HYDROXY: CPT

## 2024-02-23 PROCEDURE — 82607 VITAMIN B-12: CPT

## 2024-02-23 PROCEDURE — 84443 ASSAY THYROID STIM HORMONE: CPT

## 2024-02-23 RX ORDER — CLOTRIMAZOLE 10 MG/1
10 LOZENGE ORAL; TOPICAL
Qty: 50 TROCHE | Refills: 0 | Status: SHIPPED | OUTPATIENT
Start: 2024-02-23 | End: 2024-03-04

## 2024-02-23 RX ORDER — PHENTERMINE HYDROCHLORIDE 15 MG/1
15 CAPSULE ORAL
COMMUNITY
Start: 2024-02-19 | End: 2024-06-04 | Stop reason: ALTCHOICE

## 2024-02-23 NOTE — PROGRESS NOTES
"Subjective   Patient ID: Xochitl Banuelos is a 23 y.o. female who presents for Thrush (Patient has possible thrush. Patient noticed white spots in the back of her throat. Patient noted having no other symptoms. ).    HPI   Patient comes in today to evaluate for possible thrush.  She has had a chronic cough since August of last year and has been seeing pulmonary and has been on Symbicort inhaler which was increased just last week to 160/4.5.  She has been noticing some white spots in her throat and became concerned.  She is otherwise having no other symptoms.    She has lost 6 pounds since October 2023.    8/29/2023  Patient comes in today for evaluation of cough, congestion and wheezing.  She was vacationing in [place] last week and became sick.  She went to an urgent care in [place] and was given steroids, Z-Jd and Tessalon Perles.  She tested for COVID last Thursday and was negative and tested again on Sunday and was negative.  She quit vaping on Saturday.  She has completed the course of medication she was given but still is not feeling bet    Review of Systems   All other systems reviewed and are negative.      Objective   /87   Pulse 104   Temp 36.6 °C (97.8 °F)   Resp 18   Ht 1.6 m (5' 3\")   Wt 117 kg (258 lb 3.2 oz)   SpO2 99%   BMI 45.74 kg/m²     Physical Exam  Constitutional:       Appearance: She is well-developed. She is morbidly obese.   HENT:      Head: Normocephalic and atraumatic.      Right Ear: Tympanic membrane, ear canal and external ear normal.      Left Ear: Tympanic membrane, ear canal and external ear normal.      Nose: Nose normal.      Mouth/Throat:      Mouth: Mucous membranes are moist.      Pharynx: Oropharynx is clear.   Eyes:      Extraocular Movements: Extraocular movements intact.      Conjunctiva/sclera: Conjunctivae normal.      Pupils: Pupils are equal, round, and reactive to light.   Cardiovascular:      Rate and Rhythm: Normal rate and regular rhythm.      Heart sounds: " Normal heart sounds. No murmur heard.  Pulmonary:      Effort: Pulmonary effort is normal.      Breath sounds: Normal breath sounds. No wheezing.   Abdominal:      General: Abdomen is flat. Bowel sounds are normal.      Palpations: Abdomen is soft.   Musculoskeletal:         General: Normal range of motion.   Skin:     General: Skin is warm and dry.   Neurological:      General: No focal deficit present.      Mental Status: She is alert and oriented to person, place, and time. Mental status is at baseline.   Psychiatric:         Mood and Affect: Mood normal.         Behavior: Behavior normal.         Thought Content: Thought content normal.         Judgment: Judgment normal.       Assessment/Plan   Problem List Items Addressed This Visit             ICD-10-CM    Vitamin D deficiency E55.9    Relevant Orders    Vitamin D 25-Hydroxy,Total (for eval of Vitamin D levels)    Hypothyroidism E03.9    Relevant Orders    TSH with reflex to Free T4 if abnormal     Other Visit Diagnoses         Codes    Thrush    -  Primary B37.0    Relevant Medications    clotrimazole (Mycelex) 10 mg dax    Vitamin B12 deficiency     E53.8    Relevant Orders    CBC    Vitamin B12    Hyperlipidemia, unspecified hyperlipidemia type     E78.5    Relevant Orders    Lipid Panel    Lipid screening     Z13.220    Morbid obesity with BMI of 45.0-49.9, adult (CMS/[facility])     E66.01, Z68.42    Relevant Orders    Comprehensive Metabolic Panel        Will contact patient with lab results when available.  Continue current meds as directed.  Follow up in 2 weeks for recheck if spots or not gone, sooner if problems arise.  Continue current meds as directed.  Follow up in 6 months for recheck if all remains stable, sooner if problems arise.  Medication list reconciled.  Thank you for visiting today!      Professional services: Some of this note was completed using Dragon voice technology and sometimes the software misinterprets words. This may include  unintended errors with respect to translation of words, typographical errors or grammar errors which may not have been identified prior to finalization of the chart note. Please take this into account when reading the note. Thank you.

## 2024-02-26 ENCOUNTER — PATIENT MESSAGE (OUTPATIENT)
Dept: PRIMARY CARE | Facility: CLINIC | Age: 24
End: 2024-02-26
Payer: COMMERCIAL

## 2024-02-26 DIAGNOSIS — E03.9 HYPOTHYROIDISM, UNSPECIFIED TYPE: Primary | ICD-10-CM

## 2024-02-26 RX ORDER — LEVOTHYROXINE SODIUM 50 UG/1
50 TABLET ORAL DAILY
Qty: 30 TABLET | Refills: 3 | Status: SHIPPED | OUTPATIENT
Start: 2024-02-26 | End: 2025-02-25

## 2024-02-27 DIAGNOSIS — E53.8 VITAMIN B12 DEFICIENCY: Primary | ICD-10-CM

## 2024-02-27 RX ORDER — CYANOCOBALAMIN 1000 UG/ML
1000 INJECTION, SOLUTION INTRAMUSCULAR; SUBCUTANEOUS
Qty: 4 ML | Refills: 0 | Status: SHIPPED | OUTPATIENT
Start: 2024-02-27

## 2024-03-04 PROBLEM — G47.30 SLEEP DISORDER BREATHING: Status: ACTIVE | Noted: 2024-03-04

## 2024-03-04 NOTE — PROGRESS NOTES
Patient: Xochitl Banuelos  : 2000 AGE: 23 y.o. SEX:female   MRN: [id number]   Provider: DEEJAY Salcedo-CNP     Location [facility]   Service Date: 3/5/2024     PCP: Ronald Bradshaw DO   Referred by: No ref. provider found          [facility]  New Visit Note      HISTORY OF PRESENT ILLNESS     Xochitl Banuelos is a 23 y.o. female who presents to [facility] for a comprehensive sleep medicine evaluation, referred by pulmonology with concerns of BECKI evaluation. Reports extremely loud snoring, un freshed sleep, morning headaches, EDS.       The patient has h/o Obesity and asthma, GERD, hypothyroidism, nicotine dependence         SLEEP STUDY HISTORY (personally reviewed raw data such as interpretation report, data sheet, hypnogram, and titration table if available and applicable)  - Has never had a sleep study completed    SLEEP-WAKE SCHEDULE    Sleep schedule  on weekdays / work days:  Usual Bedtime:  2am  Subjective sleep latency: <30min  # of awakenings: No  Wake time:  11:30am  Naps: No  Average sleep duration (excluding naps): 8.5-9 hours     Sleep schedule on weekends/non work days :  same as weekdays    SLEEP ENVIRONMENT  Sleep status: sleeps alone  Preferred sleep position: side  Room is dark: Yes  Room is quiet: Yes  Room is cool: Yes  Bed comfort: good    SLEEP HABITS  Caffeine consumption: Yes, 1-2 cups/day  Alcohol consumption: Yes, rarely (occasional)  Smoking: Yes, vape nicotine   Marijuana: No  Sleep aids: melatonin 5mg - 2-3x/week    SLEEP ROS  Sleep Initiation: no problems going to sleep    Sleep Maintenance: denies problematic awakenings    Breathing during sleep: snoring, nasal congestion, and mouth breathing      Sleep-related behaviors: DENIES  hypnagogic/hypnopompic hallucinations  sleep paralysis  sleep attacks  symptoms of cataplexy  sleep walking  kicking, punching, or acting out dreams  sleep  eating  nightmares      Daytime Symptoms  On awakening patient reports: wake unrefreshed, morning headaches, morning dry mouth,  stimulant use (see med list), and teeth grinding- wears mouth guard for by dentist     Patient report some daytime symptoms including: DAYTIME SYMPTOMS: excessive daytime sleepiness sleep inertia difficulty with memory or concentration during the day  Patient denies daytime symptoms including: Denies: late to work/school due to sleepiness irritability during the day feeling sleepy when driving  Fatigue: symptoms bothersome, but easily able to carry out all usual work/school/family activities    RLS screen: Patient denies RLS symptoms.    WEIGHT: lost 31lbs in 1 year      ESS: 5  TANYA: 14  STOP-BANG: 3 (intermediate)     REVIEW OF SYSTEMS     All other systems have been reviewed and are negative.    ALLERGIES     No Known Allergies    MEDICATIONS     Current Outpatient Medications   Medication Sig Dispense Refill    albuterol 90 mcg/actuation inhaler Inhale 2 puffs 4 times a day. (Patient taking differently: Inhale 1-2 puffs. Every 4 to 6 hours as needed as directed.) 18 g 3    budesonide-formoteroL (Symbicort) 160-4.5 mcg/actuation inhaler Inhale 2 puffs 2 times a day. Rinse mouth with water after use to reduce aftertaste and incidence of candidiasis. Do not swallow. 10.2 g 3    cyanocobalamin (Vitamin B-12) 1,000 mcg/mL injection Inject 1 mL (1,000 mcg) into the muscle every 30 (thirty) days. 4 mL 0    levothyroxine (Synthroid, Levoxyl) 50 mcg tablet Take 1 tablet (50 mcg) by mouth once daily. 30 tablet 3    loratadine (Claritin) 10 mg tablet Take 1 tablet (10 mg) by mouth once daily. Take in the evening before bedtime 30 tablet 3    montelukast (Singulair) 10 mg tablet Take 1 tablet (10 mg) by mouth once daily in the evening. 90 tablet 1    omeprazole (PriLOSEC) 20 mg DR capsule Take 1 capsule (20 mg) by mouth once daily. Do not crush or chew. 90 capsule 1    syringe with needle 1 mL 25  "gauge x 5/8\" syringe 1 mL every 30 (thirty) days. 4 each 0    topiramate (Topamax) 50 mg tablet Take 1 tablet (50 mg) by mouth once daily.      phentermine 15 mg capsule Take 1 capsule (15 mg) by mouth once daily in the morning. Take before meals.       No current facility-administered medications for this visit.       PAST HISTORIES     PERTINENT PAST MEDICAL HISTORY: See HPI    PERTINENT PAST SURGICAL HISTORY for Sleep Medicine:  non-contributory    PERTINENT FAMILY HISTORY for Sleep Medicine:  sleep apnea- mother     PERTINENT SOCIAL HISTORY:  She  reports that she has never smoked. She has been exposed to tobacco smoke. She uses smokeless tobacco. She reports current alcohol use. She reports that she does not use drugs. 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 No significant changes unless documented in the pertinent chart section. Updates made when necessary.     PHYSICAL EXAM     VITAL SIGNS: /87 (BP Location: Left arm, Patient Position: Sitting, BP Cuff Size: Large adult)   Pulse 95   Temp 36.8 °C (98.3 °F) (Temporal)   Resp 16   Wt 116 kg (255 lb)   SpO2 97%   BMI 45.17 kg/m²     NECK CIRCUMFERENCE: 39cm    CURRENT WEIGHT:   Vitals:    03/05/24 0859   Weight: 116 kg (255 lb)        PREVIOUS WEIGHTS:  Wt Readings from Last 3 Encounters:   03/05/24 116 kg (255 lb)   02/23/24 117 kg (258 lb 3.2 oz)   02/13/24 118 kg (260 lb)       Physical Exam  Constitutional: Awake, not in distress  Lungs: Clear to auscultation bilateral, no cough noted  Heart: Regular rate and rhythm  Skin: Warm, no rash  Neuro: No tremors, moves all extremities  Psych: alert and oriented to time, place, and person    HEENT:   Tonsils enlargement grade 2+   Airway comments: narrow lateral walls   Tongue scalloping: slight   Modified Mallampati score - " "3        RESULTS/DATA     No results found for: \"IRON\", \"TRANSFERRIN\", \"IRONSAT\", \"TIBC\", \"FERRITIN\"    Bicarbonate   Date Value Ref Range Status   02/23/2024 23 21 - 32 mmol/L Final       ASSESSMENT/PLAN     Ms. Banuelos is a 23 y.o. female and She was referred to the [facility] for evaluation of possible sleep disordered breathing    Problem List, Orders, Assessment, Recommendations:    #Sleep disordered breathing/suspected BECKI  - Symptoms include extremely loud snoring, un freshed sleep, morning headaches, EDS   - Due to high pre-test probability without significant medical comorbidity or possible concomitant sleep disorder, I recommend home sleep apnea testing to evaluate for BECKI (if results normal- will call patient and likely order in lab PSG)   - Follow up after sleep study to review results and determine plan of care  -Diet, exercise, and weight loss were emphasized today in clinic, as were non-supine sleep, avoiding alcohol in the late evening, and driving or operating heavy machinery when sleepy.     #Obesity  BMI Readings from Last 1 Encounters:   03/05/24 45.17 kg/m²     - Encouraged healthy weight loss via diet and exercise  - Weight loss can help in the long term treatment of BECKI.  - Defer management to bariatrics (on toprimate)     # ALLERGIC RHINITIS/SEASONAL ALLERGIES/NASAL CONGESTION  - Start fluticasone nasal spray 2 sprays per nostril once daily 1 hour before bedtime.  - If there is inadequate or lack of improvement on the above intranasal steroid spray, consider adding Azelastine nasal spray, 2 sprays per nostril once daily in the morning.   - Currently on zyrtec and montelukast   - Educated patient on proper use of intranasal sprays.        #Asthma/vaping nicotine  - encouraged smoking cessation   - on meds, defer management to pulmonary- Camelia Brown    All of patient's questions were answered. She verbalizes understanding and agreement with my assessment " and plan.    Disposition    Return to clinic in 2 months

## 2024-03-05 ENCOUNTER — OFFICE VISIT (OUTPATIENT)
Dept: SLEEP MEDICINE | Facility: CLINIC | Age: 24
End: 2024-03-05
Payer: COMMERCIAL

## 2024-03-05 VITALS
DIASTOLIC BLOOD PRESSURE: 87 MMHG | RESPIRATION RATE: 16 BRPM | SYSTOLIC BLOOD PRESSURE: 126 MMHG | WEIGHT: 255 LBS | HEART RATE: 95 BPM | TEMPERATURE: 98.3 F | OXYGEN SATURATION: 97 % | BODY MASS INDEX: 45.17 KG/M2

## 2024-03-05 DIAGNOSIS — J30.9 ALLERGIC RHINITIS, UNSPECIFIED SEASONALITY, UNSPECIFIED TRIGGER: ICD-10-CM

## 2024-03-05 DIAGNOSIS — E66.01 CLASS 3 SEVERE OBESITY DUE TO EXCESS CALORIES WITHOUT SERIOUS COMORBIDITY WITH BODY MASS INDEX (BMI) OF 45.0 TO 49.9 IN ADULT (MULTI): ICD-10-CM

## 2024-03-05 DIAGNOSIS — G47.30 SLEEP DISORDER BREATHING: Primary | ICD-10-CM

## 2024-03-05 DIAGNOSIS — Z72.0 VAPES NICOTINE CONTAINING SUBSTANCE: ICD-10-CM

## 2024-03-05 PROBLEM — E66.813 CLASS 3 SEVERE OBESITY DUE TO EXCESS CALORIES WITHOUT SERIOUS COMORBIDITY WITH BODY MASS INDEX (BMI) OF 45.0 TO 49.9 IN ADULT: Status: ACTIVE | Noted: 2023-09-30

## 2024-03-05 PROCEDURE — 99204 OFFICE O/P NEW MOD 45 MIN: CPT | Performed by: NURSE PRACTITIONER

## 2024-03-05 PROCEDURE — 3008F BODY MASS INDEX DOCD: CPT | Performed by: NURSE PRACTITIONER

## 2024-03-05 ASSESSMENT — SLEEP AND FATIGUE QUESTIONNAIRES
SLEEP_PROBLEM_INTERFERES_DAILY_ACTIVITIES: MUCH
WORRIED_DISTRESSED_DUE_TO_SLEEP: SOMEWHAT
HOW LIKELY ARE YOU TO NOD OFF OR FALL ASLEEP WHILE SITTING QUIETLY AFTER LUNCH WITHOUT ALCOHOL: WOULD NEVER DOZE
ESS TOTAL SCORE: 5
HOW LIKELY ARE YOU TO NOD OFF OR FALL ASLEEP IN A CAR, WHILE STOPPED FOR A FEW MINUTES IN TRAFFIC: WOULD NEVER DOZE
HOW LIKELY ARE YOU TO NOD OFF OR FALL ASLEEP WHILE SITTING AND TALKING TO SOMEONE: WOULD NEVER DOZE
HOW LIKELY ARE YOU TO NOD OFF OR FALL ASLEEP IN A CAR, WHILE STOPPED FOR A FEW MINUTES IN TRAFFIC: WOULD NEVER DOZE
HOW LIKELY ARE YOU TO NOD OFF OR FALL ASLEEP WHEN YOU ARE A PASSENGER IN A CAR FOR AN HOUR WITHOUT A BREAK: WOULD NEVER DOZE
DIFFICULTY_FALLING_ASLEEP: MILD
SLEEP_PROBLEM_NOTICEABLE_TO_OTHERS: MUCH
HOW LIKELY ARE YOU TO NOD OFF OR FALL ASLEEP WHILE SITTING INACTIVE IN A PUBLIC PLACE: WOULD NEVER DOZE
SITING INACTIVE IN A PUBLIC PLACE LIKE A CLASS ROOM OR A MOVIE THEATER: WOULD NEVER DOZE
SATISFACTION_WITH_CURRENT_SLEEP_PATTERN: DISSATISFIED
ESS-CHAD TOTAL SCORE: 5
HOW LIKELY ARE YOU TO NOD OFF OR FALL ASLEEP WHILE WATCHING TV: MODERATE CHANCE OF DOZING
WAKING_TOO_EARLY: MODERATE
HOW LIKELY ARE YOU TO NOD OFF OR FALL ASLEEP WHILE LYING DOWN TO REST IN THE AFTERNOON WHEN CIRCUMSTANCES PERMIT: MODERATE CHANCE OF DOZING
HOW LIKELY ARE YOU TO NOD OFF OR FALL ASLEEP WHILE SITTING AND READING: SLIGHT CHANCE OF DOZING

## 2024-03-05 ASSESSMENT — ENCOUNTER SYMPTOMS
OCCASIONAL FEELINGS OF UNSTEADINESS: 0
LOSS OF SENSATION IN FEET: 0
DEPRESSION: 0

## 2024-03-05 ASSESSMENT — PATIENT HEALTH QUESTIONNAIRE - PHQ9
SUM OF ALL RESPONSES TO PHQ9 QUESTIONS 1 AND 2: 0
1. LITTLE INTEREST OR PLEASURE IN DOING THINGS: NOT AT ALL
2. FEELING DOWN, DEPRESSED OR HOPELESS: NOT AT ALL

## 2024-03-05 ASSESSMENT — PAIN SCALES - GENERAL: PAINLEVEL: 0-NO PAIN

## 2024-03-05 ASSESSMENT — COLUMBIA-SUICIDE SEVERITY RATING SCALE - C-SSRS
6. HAVE YOU EVER DONE ANYTHING, STARTED TO DO ANYTHING, OR PREPARED TO DO ANYTHING TO END YOUR LIFE?: NO
1. IN THE PAST MONTH, HAVE YOU WISHED YOU WERE DEAD OR WISHED YOU COULD GO TO SLEEP AND NOT WAKE UP?: NO
2. HAVE YOU ACTUALLY HAD ANY THOUGHTS OF KILLING YOURSELF?: NO

## 2024-03-05 NOTE — PATIENT INSTRUCTIONS
[facility] Sleep Medicine  DO [address] [place] [address] [place] [address]       NAME: Xochitl Banuelos   DATE: 03/05/24    Your Sleep Provider Today: MUNDO Salcedo  Your Primary Care Physician: Ronald Bradshaw DO       DIAGNOSIS:   1. Sleep disorder breathing            Thank you for coming to the Sleep Medicine Clinic today! Your sleep medicine provider today was: MUNDO Salcedo Below is a summary of your treatment plan, other important information, and our contact numbers:      TREATMENT PLAN     - Get your sleep study scheduled-  [facility]   - Follow-up in 2 months.  - If not already done, sign up for 'My Chart' and send prescription requests or messages through this      Scheduling a Sleep Study      Your provider ordered a sleep apnea test today. It will usually take 2 - 3 business days for Insurance to approve the order.     Once you test is approved it will be sent to the ordering sleep lab. When the sleep lab is notified of the new order, they will reach out to you to get you scheduled for an in-lab sleep study or to get you scheduled for a pick-up date for your Home Sleep Study Kit (depending on which type of study your provider recommended).    They usually will reach out to you about 1 week after your test has been ordered. Please contact the office at [phone] if you have not heard back from them in 2 weeks after you have seen your provider. See below for list of sleep lab contact numbers, if needed.     Sleep Testing for sleep apnea: The best and ideal way to check out if you have sleep apnea is to do an overnight sleep study in the sleep laboratory. Alternatively, a home sleep apnea test can also be done depending on your insurance and risk factors.     If you are having a home sleep apnea test, kindly allot 1 hour during pickup of the testing kit as you will have to complete paperwork and listen to the sleep  technician for in-person on-the-spot demonstration and instructions on how to hook up the testing kit at home. Do the test for 1 day and start off with sleeping on your back. If you sleep on your side in the middle of night or you have always been a side or stomach-sleeper, it is ok as long as you have some time on your back and off-back.     If you are having an overnight in-lab sleep study, please make sure to bring toiletries, a comfy pillow, additional warm blankets, and any nighttime medications (include as-needed inhaler, pain pill, etc) that you may regularly take. Also, be sure to eat dinner before you arrive as we generally do not provide meals inside the sleep testing center. Lastly, in order to fall asleep faster in the sleep testing center, we advise patients to wake up 2 hours earlier on the morning of scheduled testing and avoid napping 2 days prior testing. Sometimes, your sleep provider may prescribe a sleep aid to be taken at lights out in the sleep testing center. If you are taking a sleep aid, consider having somebody pick you up after the sleep testing.    Overnight sleep studies may be scheduled on a weekday or weekend. We also perform daytime testing for shift workers on a case-by-case basis.    Once you have booked an appointment to do the sleep study, please contact my office for follow-up visit to discuss results.         IMPORTANT INFORMATION     Call 911 for medical emergencies.  Our offices are generally open from Monday-Friday, 9 am - 5 pm.  If you need to get in touch with me, you may either call me/my team (number is below) or you can use [employer].  If a referral for a test, for CPAP, or for another specialist was made, and you have not heard about scheduling this within a week, please call scheduling at [phone]).  If you are unable to make your appointment for clinic or an overnight study, kindly call the office at least 48 hours in advance to cancel and reschedule.  If you  "are on CPAP, please bring your device's card or the device to each clinic appointment.   There are no supporting services by either the sleep doctors or their staff on weekends and Holidays, or after 5 PM on weekdays.   If you have been asked to come to a sleep study, make sure you bring toiletries, a comfy pillow, and any nighttime medications that you may regularly take. Also be sure to eat dinner before you arrive. We generally do not provide meals.      PRESCRIPTIONS     We require 7 days advanced notice for prescription refills. If we do not receive the request in this time, we cannot guarantee that your medication will be refilled in time.      IMPORTANT PHONE NUMBERS       Appointments (for Adult Sleep Clinic): [phone] - option 2  Appointments (For Sleep Studies): [phone] - option 3  Behavioral Sleep Medicine: [phone]  Sleep Surgery: [phone]  ENT (Otolaryngology): [phone]  Headache Clinic (Neurology): [phone]  Neurology: [phone]  Psychiatry: [phone]  Pulmonary Function Testing (PFT) Center: [phone]  Pulmonary Medicine: [phone]  [employer] ([employer]): [phone]  [employer] ([employer]): [phone]  [facility] (DME): [phone]        CONTACTING YOUR SLEEP MEDICINE PROVIDER AND SLEEP TEAM      For issues with your machine or mask interface, please call your DME provider first. [employer] stands for durable medical company. [employer] is the company who provides you the machine and/or PAP supplies / accessories.   To schedule, cancel, or reschedule SLEEP STUDY APPOINTMENTS, please call the Main Phone Line at [phone] - option 3.   To schedule, cancel, or reschedule CLINIC APPOINTMENTS, you can do it in \"MyChart\", call [phone] for [facility] office , [phone] for [facility] office to speak with my on site staff, or call the Main Phone Line at [phone] - option 2  For CLINICAL QUESTIONS or MEDICATION REFILLS, " "please call direct line for Adult Sleep Nurses at [phone].   Lastly, you can also send a message directly to your provider through \"My Chart\", which is the email service through your  Records Account: [email or web address]     Adult Sleep Nurses (Allison Marin, RN and Venus Avalos, RN):  For clinical questions and refilling prescriptions: [phone]  Email sleep diaries and other documents at: [email or web address]    Office locations for Aminata Miles NP:    [facility]   [address] [place] [address] [address].   [address]  [place] [address] [phone]    [address] [facility]  [address]  [place] [phone]      OUR SLEEP TESTING LOCATIONS     Our team will contact you to schedule your sleep study, however, you can contact us as follow:  Main Phone Line (scheduling only): [phone]), option 3    Sleep Testing Locations:   [place] (18 years and older): [address] [place] [address], 2nd floor   [place] (18 years and older): [address] [facility]; 4th floor  After hours line: [phone]  [facility] (18 years and older) at [place]: [address] [place] [address]  After hours line: [phone]   [facility] at [facility] (Main campus: All ages): [facility], 6th floor. After hours line: [phone]   [place] (5 years and older; younger considered on case-by-case basis): [address] [place] [address]; [address], [address]. Scheduling  After hours line: [phone]       Here at [facility], we wish you a restful sleep!    Your sleep medicine provider for this visit was: Aminata Miles, APRN-CNP   "

## 2024-03-06 RX ORDER — LORATADINE 10 MG/1
10 TABLET ORAL DAILY
Qty: 90 TABLET | Refills: 1 | Status: SHIPPED | OUTPATIENT
Start: 2024-03-06 | End: 2024-06-04 | Stop reason: WASHOUT

## 2024-03-16 ENCOUNTER — CLINICAL SUPPORT (OUTPATIENT)
Dept: SLEEP MEDICINE | Facility: HOSPITAL | Age: 24
End: 2024-03-16
Payer: COMMERCIAL

## 2024-03-16 DIAGNOSIS — G47.30 SLEEP DISORDER BREATHING: ICD-10-CM

## 2024-03-16 PROCEDURE — 95806 SLEEP STUDY UNATT&RESP EFFT: CPT | Performed by: INTERNAL MEDICINE

## 2024-03-17 DIAGNOSIS — K21.9 GASTROESOPHAGEAL REFLUX DISEASE WITHOUT ESOPHAGITIS: ICD-10-CM

## 2024-03-17 DIAGNOSIS — J45.909 UNSPECIFIED ASTHMA, UNCOMPLICATED (HHS-HCC): ICD-10-CM

## 2024-03-20 RX ORDER — OMEPRAZOLE 20 MG/1
20 CAPSULE, DELAYED RELEASE ORAL DAILY
Qty: 90 CAPSULE | Refills: 1 | Status: SHIPPED | OUTPATIENT
Start: 2024-03-20 | End: 2024-09-16

## 2024-03-20 RX ORDER — MONTELUKAST SODIUM 10 MG/1
10 TABLET ORAL EVERY EVENING
Qty: 90 TABLET | Refills: 1 | Status: SHIPPED | OUTPATIENT
Start: 2024-03-20 | End: 2024-09-16

## 2024-05-08 NOTE — PROGRESS NOTES
Patient: Xochitl Banuelos  : 2000 AGE: 23 y.o. SEX:female   MRN: [id number]   Provider: MUNDO Salcedo     Location [facility]   Service Date: 2024     PCP: Ronald Bradshaw DO   Referred by: No ref. provider found          [facility] Sleep Medicine Clinic  Follow Up Visit Note      HISTORY OF PRESENT ILLNESS     Xochitl Banuelos is a 23 y.o. female with a  h/o Obesity and asthma, GERD, hypothyroidism, nicotine dependence who presents to [facility] Sleep Medicine Clinic for a follow up.     24: Here to review HSAT results. Sleep is the same as prior visit, continues to wake un refreshed. Works second shift, sleep schedule 2am-11:30am. Willing to try CPAP. ---> APAP -15 ordered through [facility].     3/5/24: NPV, referred by pulmonology with concerns of BECKI evaluation. Reports extremely loud snoring, un freshed sleep, morning headaches, EDS. ----> HSAT ordered      SLEEP STUDY HISTORY (personally reviewed raw data such as interpretation report, data sheet, hypnogram, and titration table if available and applicable)  - HSAT 3/16/24- showing AHI 8.7, SpO2 negin 80.4%     SLEEP-WAKE SCHEDULE    Sleep schedule  on weekdays / work days:  Usual Bedtime:  2am  Subjective sleep latency: <30min  # of awakenings: No  Wake time:  11:30am  Naps: No  Average sleep duration (excluding naps): 8.5-9 hours     Sleep schedule on weekends/non work days :  same as weekdays    SLEEP ENVIRONMENT  Sleep status: sleeps alone  Preferred sleep position: side  Room is dark: Yes  Room is quiet: Yes  Room is cool: Yes  Bed comfort: good    SLEEP HABITS  Caffeine consumption: Yes, 1-2 cups/day  Alcohol consumption: Yes, rarely (occasional)  Smoking: Yes, vape nicotine   Marijuana: No  Sleep aids: melatonin 5mg - 2-3x/week    SLEEP ROS  Sleep Initiation: no problems going to sleep    Sleep Maintenance: denies problematic awakenings    Breathing during sleep: snoring, nasal congestion, and  mouth breathing      Sleep-related behaviors: DENIES  hypnagogic/hypnopompic hallucinations  sleep paralysis  sleep attacks  symptoms of cataplexy  sleep walking  kicking, punching, or acting out dreams  sleep eating  nightmares      Daytime Symptoms  On awakening patient reports: wake unrefreshed, morning headaches, morning dry mouth,  stimulant use (see med list), and teeth grinding- wears mouth guard for by dentist     Patient report some daytime symptoms including: DAYTIME SYMPTOMS: excessive daytime sleepiness sleep inertia difficulty with memory or concentration during the day  Patient denies daytime symptoms including: Denies: late to work/school due to sleepiness irritability during the day feeling sleepy when driving  Fatigue: symptoms bothersome, but easily able to carry out all usual work/school/family activities    RLS screen: Patient denies RLS symptoms.    WEIGHT: lost 31lbs in 1 year      ESS: 7  TANYA:    STOP-BANG: 3 (intermediate)     REVIEW OF SYSTEMS     All other systems have been reviewed and are negative.    ALLERGIES     No Known Allergies    MEDICATIONS     Current Outpatient Medications   Medication Sig Dispense Refill    albuterol 90 mcg/actuation inhaler Inhale 2 puffs 4 times a day. (Patient taking differently: Inhale 1-2 puffs. Every 4 to 6 hours as needed as directed.) 18 g 3    budesonide-formoteroL (Symbicort) 160-4.5 mcg/actuation inhaler Inhale 2 puffs 2 times a day. Rinse mouth with water after use to reduce aftertaste and incidence of candidiasis. Do not swallow. 10.2 g 3    cyanocobalamin (Vitamin B-12) 1,000 mcg/mL injection Inject 1 mL (1,000 mcg) into the muscle every 30 (thirty) days. 4 mL 0    levothyroxine (Synthroid, Levoxyl) 50 mcg tablet Take 1 tablet (50 mcg) by mouth once daily. 30 tablet 3    montelukast (Singulair) 10 mg tablet TAKE 1 TABLET (10 MG) BY MOUTH ONCE DAILY IN THE EVENING. 90 tablet 1    omeprazole (PriLOSEC) 20 mg DR capsule TAKE 1 CAPSULE (20 MG) BY  "MOUTH ONCE DAILY DO NOT CRUSH OR CHEW 90 capsule 1    topiramate (Topamax) 50 mg tablet Take 1 tablet (50 mg) by mouth once daily.      loratadine (Claritin) 10 mg tablet TAKE 1 TABLET (10 MG) BY MOUTH ONCE DAILY. TAKE IN THE EVENING BEFORE BEDTIME (Patient not taking: Reported on 5/14/2024) 90 tablet 1    phentermine 15 mg capsule Take 1 capsule (15 mg) by mouth once daily in the morning. Take before meals.       No current facility-administered medications for this visit.       PAST HISTORIES     PERTINENT PAST MEDICAL HISTORY: See HPI    PERTINENT PAST SURGICAL HISTORY for Sleep Medicine:  non-contributory    PERTINENT FAMILY HISTORY for Sleep Medicine:  sleep apnea- mother     PERTINENT SOCIAL HISTORY:  She  reports that she has never smoked. She has been exposed to tobacco smoke. She uses smokeless tobacco. She reports current alcohol use. She reports that she does not use drugs. 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 No significant changes unless documented in the pertinent chart section. Updates made when necessary.     PHYSICAL EXAM     VITAL SIGNS: /81   Pulse 93   Resp 18   Ht 1.6 m (5' 3\")   Wt 112 kg (246 lb 6.4 oz)   SpO2 96%   BMI 43.65 kg/m²     NECK CIRCUMFERENCE: 39cm    CURRENT WEIGHT:   Vitals:    05/14/24 1055   Weight: 112 kg (246 lb 6.4 oz)          PREVIOUS WEIGHTS:  Wt Readings from Last 3 Encounters:   05/14/24 112 kg (246 lb 6.4 oz)   03/05/24 116 kg (255 lb)   02/23/24 117 kg (258 lb 3.2 oz)     Constitutional: Alert and oriented, cooperative, no obvious distress   HEENT: Non icteric or anemic, EOM WNL bilaterally   Neck: Supple, no JVD, no goiter, no adenopathy, no rigidity     RESULTS/DATA     No results found for: \"IRON\", \"TRANSFERRIN\", \"IRONSAT\", \"TIBC\", \"FERRITIN\"    Bicarbonate   Date Value Ref " Range Status   02/23/2024 23 21 - 32 mmol/L Final       ASSESSMENT/PLAN     Ms. Banuelos is a 23 y.o. female and She was referred to the [facility] Sleep Medicine [facility] for evaluation of BECKI    Problem List, Orders, Assessment, Recommendations:    # BECKI, mild  - HSAT 3/16/24- showing AHI 8.7, SpO2 negin 80.4%   - will start APAP 5-15 cwp via DME- [facility]  - Patient fit with AIrFit P10 XS, sample mask provided in office today  - Patient has nose ring, if failed CPAP would consider OAT in the future   -Sleep apnea, PAP therapy education as well as the tips to be successful with PAP treatment was provided at length in clinic today. Patient verbalized understanding.  - Discussed 30-day mask guarantee and insurance requirement regarding PAP compliance and follow-up.   -Diet, exercise, and weight loss were emphasized today in clinic, as were non-supine sleep, avoiding alcohol in the late evening, and driving or operating heavy machinery when sleepy.   - patient will follow-up in 2-3 months and bring equipment to the follow-up clinic      #Obesity  BMI Readings from Last 1 Encounters:   05/14/24 43.65 kg/m²     - Encouraged healthy weight loss via diet and exercise  - Weight loss can help in the long term treatment of BECKI.  - Defer management to bariatrics (on toprimate)     # ALLERGIC RHINITIS/SEASONAL ALLERGIES/NASAL CONGESTION  - Start fluticasone nasal spray 2 sprays per nostril once daily 1 hour before bedtime.  - If there is inadequate or lack of improvement on the above intranasal steroid spray, consider adding Azelastine nasal spray, 2 sprays per nostril once daily in the morning.   - Currently on zyrtec and montelukast   - Educated patient on proper use of intranasal sprays.        #Asthma/vaping nicotine  - encouraged smoking cessation   - on meds, defer management to pulmonary- Camelia Brown    All of patient's questions were answered. She verbalizes understanding and agreement with my assessment and  plan.    Disposition    Return to clinic in 3 months

## 2024-05-14 ENCOUNTER — OFFICE VISIT (OUTPATIENT)
Dept: SLEEP MEDICINE | Facility: CLINIC | Age: 24
End: 2024-05-14
Payer: COMMERCIAL

## 2024-05-14 VITALS
RESPIRATION RATE: 18 BRPM | HEIGHT: 63 IN | BODY MASS INDEX: 43.66 KG/M2 | WEIGHT: 246.4 LBS | HEART RATE: 93 BPM | DIASTOLIC BLOOD PRESSURE: 81 MMHG | SYSTOLIC BLOOD PRESSURE: 112 MMHG | OXYGEN SATURATION: 96 %

## 2024-05-14 DIAGNOSIS — G47.33 OSA (OBSTRUCTIVE SLEEP APNEA): Primary | ICD-10-CM

## 2024-05-14 PROCEDURE — 3008F BODY MASS INDEX DOCD: CPT | Performed by: NURSE PRACTITIONER

## 2024-05-14 PROCEDURE — 99214 OFFICE O/P EST MOD 30 MIN: CPT | Performed by: NURSE PRACTITIONER

## 2024-05-14 ASSESSMENT — ENCOUNTER SYMPTOMS
LOSS OF SENSATION IN FEET: 0
DEPRESSION: 0
OCCASIONAL FEELINGS OF UNSTEADINESS: 0

## 2024-05-14 ASSESSMENT — PATIENT HEALTH QUESTIONNAIRE - PHQ9
2. FEELING DOWN, DEPRESSED OR HOPELESS: NOT AT ALL
1. LITTLE INTEREST OR PLEASURE IN DOING THINGS: NOT AT ALL
SUM OF ALL RESPONSES TO PHQ9 QUESTIONS 1 AND 2: 0

## 2024-05-14 ASSESSMENT — SLEEP AND FATIGUE QUESTIONNAIRES
HOW LIKELY ARE YOU TO NOD OFF OR FALL ASLEEP WHILE WATCHING TV: MODERATE CHANCE OF DOZING
ESS-CHAD TOTAL SCORE: 7
HOW LIKELY ARE YOU TO NOD OFF OR FALL ASLEEP WHILE LYING DOWN TO REST IN THE AFTERNOON WHEN CIRCUMSTANCES PERMIT: SLIGHT CHANCE OF DOZING
HOW LIKELY ARE YOU TO NOD OFF OR FALL ASLEEP WHEN YOU ARE A PASSENGER IN A CAR FOR AN HOUR WITHOUT A BREAK: SLIGHT CHANCE OF DOZING
SITING INACTIVE IN A PUBLIC PLACE LIKE A CLASS ROOM OR A MOVIE THEATER: SLIGHT CHANCE OF DOZING
HOW LIKELY ARE YOU TO NOD OFF OR FALL ASLEEP WHILE SITTING AND TALKING TO SOMEONE: WOULD NEVER DOZE
HOW LIKELY ARE YOU TO NOD OFF OR FALL ASLEEP WHILE SITTING QUIETLY AFTER LUNCH WITHOUT ALCOHOL: WOULD NEVER DOZE
HOW LIKELY ARE YOU TO NOD OFF OR FALL ASLEEP IN A CAR, WHILE STOPPED FOR A FEW MINUTES IN TRAFFIC: WOULD NEVER DOZE
HOW LIKELY ARE YOU TO NOD OFF OR FALL ASLEEP WHILE SITTING AND READING: MODERATE CHANCE OF DOZING

## 2024-05-14 NOTE — PATIENT INSTRUCTIONS
[facility] Sleep Medicine  DO [address] [place] [address] [place] [address]       NAME: Xochitl Banuelos   DATE: 05/14/24    Your Sleep Provider Today: MUNDO Salcedo  Your Primary Care Physician: Ronald Bradshaw DO       DIAGNOSIS:   1. BECKI (obstructive sleep apnea)            Thank you for coming to the Sleep Medicine Clinic today! Your sleep medicine provider today was: MUNDO Salcedo Below is a summary of your treatment plan, other important information, and our contact numbers:      TREATMENT PLAN     - Follow-up in 3 months.  - If not already done, sign up for 'My Chart' and send prescription requests or messages through this    Obstructive sleep apnea (BECKI): BECKI is a sleep disorder where your upper airway muscles relax during sleep and the airway intermittently and repetitively narrows and collapses leading to blocked airway (apnea) which, in turn, can disrupt breathing in sleep, lower oxygen levels while you sleep and cause night time wakings. Because apnea may cause higher carbon dioxide or low oxygen levels, untreated BECKI can lead to heart arrhythmia, elevation of blood pressure, and make it harder for the body to consolidate memory and metabolize (leading to higher blood sugars at night).   Frequent partial arousals occur during sleep resulting in sleep deprivation and daytime sleepiness. BECKI is associated with an increased risk of cardiovascular disease, stroke, hypertension, and insulin resistance. Moreover, untreated BECKI with excessive daytime sleepiness can increase the risk of motor vehicular accidents.    Some conservative strategies for BECKI are:   Positional therapy - Avoid sleeping on your back.   Healthy diet, exercise, and optimizing weight encouraged.   Avoid alcohol late in the evening as it can make sleep apnea worse.     Safety: Avoid driving and operating heavy equipment while sleepy. Drowsy driving may lead  "to life-threatening motor vehicle accidents.     Common treatment options for sleep apnea include weight management, positional therapy, Positive Airway Therapy (PAP) therapy, oral appliance therapy, hypoglossal nerve stimulation, and select airway surgeries.    Starting Positive Airway Pressure: You were ordered a device to wear when you sleep called PAP (Positive Airway Pressure) to treat your sleep apnea. The order will be submitted to a durable medical equipment company who will arrange setting you up with the device. They will provide all the necessary equipment and discuss use and maintenance of the device with you.     **Please bring all PAP equipment with you to follow up appointments unless told otherwise.**           Important things to keep in mind as you start PAP    Insurance will monitor your usage during the first 90 days.  You should use your PAP - \"all night, every night\", for your health. The bare minimum is to use your PAP device while sleeping = at least 4 hours per day at least 5 days per week. Otherwise, your PAP device may be reclaimed by your PAP vendor at 90 days.  There are many mask to choose from to wear with your PAP machine. If you are not comfortable with the first mask issued to you, call your DME and ask for another option to try (within the first 30 days).  Discuss with your provider if you are having issues breathing with the machine or the temperature or humidity feel uncomfortable.  Expect to have an adjustment period when you start your device. It helps to continuing wearing the machine every day for a period of time until you get more used to it. You can practice with wearing the mask alone if you need, then add in the PAP air pressure a few days later.   Reach out for help if you are struggling! The sleep medicine department can be reached at [phone]  We encourage you to download data monitoring apps to your phone. For [employer] 10/11 - MyAir dell. For [employer] " DreamStation - DreamMapper. Both are available in the [employer] store for free and are a great tool to monitor your progress with your CPAP night to night.    IF YOU ARE HAVING TROUBLE GETTING USED TO YOUR PAP DEVICE    The following steps are recommended to help you get accustomed to using CPAP and improve your CPAP usage at home:    Use CPAP every night for 5 to 10 minutes while awake in the evening without fail.  Be sure to remain awake while breathing on the nasal mask for the first 1 to 2 weeks.  After 2 to 4 weeks, start using CPAP at night when you go to sleep…But be sure to take the mask off if you have not fallen asleep in 5 to 10 minutes, or if you fall asleep.  Take the mask off whenever you become aware of it or the moment you wake up.   Continue this process every night, with confidence that you will gradually become accustomed to using CPAP over time.    Be sure you are using a comfortable mask, and that you are applying it snugly (but not too tight).    Common issues with PAP machine:  Mask gets dislodged when turning to the side: Consider getting a CPAP pillow or switching to a mask with hose on top.     Dry mouth:  Your machine has built-in humidifier that heats up the air to prevent dry mouth. It can be adjusted to your comfort. You can try that first and increase setting one level one night at a time to check which setting is comfortable and effective in lessening dry mouth. If dry mouth persists despite humidity setting adjustment, may apply OTC Biotene gel over the gums at bedtime.  If Biotene gel is not effective, consider trying XEROSTOM gel from [employer].  If using nasal pillows or nasal mask, consider adding chinstrap or mouth tape to keep your mouth closed while you sleep. Also, eliminate or reduce dose of meds that can cause dry mouth if possible. Lastly, may try getting a separate room humidifier machine.    Airleaks: Please call DME as they may need to adjust your mask or refit you with a  "different kind of mask. In addition, you can ask DME for tips on getting a good mask seal and mask fit.     Difficulty tolerating the mask: Contact your DME to try a different kind of mask and/or call office to get a referral to Sleep Psychologist for CPAP desensitization. CPAP desensitization technique is a set of strategies that helps patient cope with claustrophobia and anxiety related to wearing mask. Alternatively, we can do a daytime mini-sleep study called PAP-nap trial wherein you will try on different kinds of mask and the sleep technician will try different pressure settings on CPAP and BPAP machines to see which specific pressure is tolerable and comfortable for you.     Water droplets or moisture within the hose and/or mask: This is called rain-out and it is caused by condensation of too much heated humidity on the cooler walls of the hose. If you have rain-out, turn down/decrease your humidity setting or get a heated hose. If you already have a heated hose, turn up the \"tube temperature\" of the heated hose. Alternatively, if you don't want to get a heated hose or warmer air, may wrap the CPAP hose with stockings to keep it somewhat warm. Also, you need to place the machine on the floor and lower the hose so that water won't travel upward towards your mask.     Maintaining your CPAP/BPAP device:    The humidification chamber (aka water tank or water chamber) needs to be filled with distilled water to prevent buildup of white deposits in the future. If you cannot find distilled water, you can use tap water but expect to have white deposits buildup seen after prolonged use with tap water. If you start seeing white deposits on the water chamber, you can clean it by filling it with equal parts of distilled white vinegar and water. Let the vinegar-water mixture sit for 2 hours, and then rinse it with running tap water. Clean with soap and water then let it dry.     You should try to keep your machine clean in " order to work well. Here are some tips to clean PAP supplies / accessories:    Clean the humidification chamber (aka water tank) as well as your mask and tubing at least once a week with soap and water.   Alternatively, you can fill a sink or basin with warm water and add a little mild detergent, like Ivory dish soap. Gently wipe your supplies with the soapy water to free all the oils and dirt that may have collected. Once that's done, rinse these items with clean water until the soap is gone and let them air dry. You can hang your tubing over the curtain sunny in your bathroom so that it dries.  The mask insert (part of the mask that has contact with your skin) needs to be cleaned with soap and water daily. Another option is to wipe them down with CPAP wipes or baby wipes.    You should replace your mask and tubing frequently in order to prevent bacteria buildup, machine damage, and mask seal issues. The older the mask and hose, the high likelihood that there is bacteria buildup in it especially if they are not cleaned regularly. Dirty filters damage machines because build-up of dust and contaminants can cause machine to over-heat, and in time, damage the motor of machine. Cushions lose their seal over time as most masks are made of plastic and silicone while headgear is made of neoprene. These materials will break down with age and frequent use. Here is the recommended replacement schedule for PAP supplies / accessories:    Twice a month- disposable filters and cushions for nasal mask or nasal pillows.  Once a month- cushion for full face mask  Every 3 months- mask with headgear and PAP tubing (standard or heated hose)  Every 6 months- reusable filter, water chamber, and chin strap     Other useful information:    Some people do not put water in the tank while other people prefers to put water in the tank to prevent mouth dryness. Try to experiment to determine which is more comfortable for you.   In general, new  machines have 2 years warranty on parts while health insurance allows you to have a new machine once every 5 years.     You can also go to the following EDUCATION WEBSITES for further information:   American Academy of Sleep Medicine http://sleepeducation.org  National Sleep Foundation: https://sleepfoundation.org  American Sleep Apnea Association: https://www.sleepapnea.org (for patients with sleep apnea)    Here at [facility], we wish you a restful sleep!       IMPORTANT INFORMATION     Call 911 for medical emergencies.  Our offices are generally open from Monday-Friday, 9 am - 5 pm.  If you need to get in touch with me, you may either call me/my team (number is below) or you can use [employer].  If a referral for a test, for CPAP, or for another specialist was made, and you have not heard about scheduling this within a week, please call scheduling at [phone] ([phone]).  If you are unable to make your appointment for clinic or an overnight study, kindly call the office at least 48 hours in advance to cancel and reschedule.  If you are on CPAP, please bring your device's card or the device to each clinic appointment.   There are no supporting services by either the sleep doctors or their staff on weekends and Holidays, or after 5 PM on weekdays.   If you have been asked to come to a sleep study, make sure you bring toiletries, a comfy pillow, and any nighttime medications that you may regularly take. Also be sure to eat dinner before you arrive. We generally do not provide meals.      PRESCRIPTIONS     We require 7 days advanced notice for prescription refills. If we do not receive the request in this time, we cannot guarantee that your medication will be refilled in time.      IMPORTANT PHONE NUMBERS       Appointments (for Adult Sleep Clinic): [phone] - option 2  Appointments (For Sleep Studies): [phone] - option 3  Behavioral Sleep Medicine: [phone]  Sleep Surgery:  [phone]  ENT (Otolaryngology): [phone]  Headache Clinic (Neurology): [phone]  Neurology: [phone]  Psychiatry: [phone]  Pulmonary Function Testing (PFT) Center: [phone]  Pulmonary Medicine: [phone]  [employer] (DME): [phone]  [employer] (DME): [phone]  [facility] (DME): [phone]        CONTACTING YOUR SLEEP MEDICINE PROVIDER AND SLEEP TEAM      For issues with your machine or mask interface, please call your DME provider first. [employer] stands for durable medical company. [employer] is the company who provides you the machine and/or PAP supplies / accessories.   To schedule, cancel, or reschedule SLEEP STUDY APPOINTMENTS, please call the Main Phone Line at [phone] - option 3.   To schedule, cancel, or reschedule CLINIC APPOINTMENTS, you can do it in \"[employer]\", call [phone] for [facility] office , [phone] for [facility] office to speak with my on site staff, or call the Main Phone Line at [phone]) - option 2  For CLINICAL QUESTIONS or MEDICATION REFILLS, please call direct line for Adult Sleep Nurses at [phone].   Lastly, you can also send a message directly to your provider through \"My Chart\", which is the email service through your  Records Account: [email or web address]     Adult Sleep Nurses (Allison Marin, RN and Venus Avalos RN):  For clinical questions and refilling prescriptions: [phone]  Email sleep diaries and other documents at: [email or web address]    Office locations for Aminata Miles NP:    [address]  [place] [address] [phone]    [address]  [address]  [place] [address] [phone]      OUR SLEEP TESTING LOCATIONS     Our team will contact you to schedule your sleep study, however, you can contact us as follow:  Main Phone Line (scheduling only): [phone]), option 3    Sleep " Testing Locations:   [place] (18 years and older): [address] [place] [address], 2nd floor   [place] (18 years and older): [address] [facility]; 4th floor  After hours line: [phone]   [place] (18 years and older) at [place]: [id number] [place] [address]  After hours line: [phone]   [facility] (Main campus: All ages): [facility], 6th floor. After hours line: [phone]   [place] (5 years and older; younger considered on case-by-case basis): [address] [facility]; [employer] [address], [address]. Scheduling  After hours line: [phone]       Here at [facility], we wish you a restful sleep!    Your sleep medicine provider for this visit was: DEEJAY Salcedo-CNP

## 2024-05-28 ENCOUNTER — TELEPHONE (OUTPATIENT)
Dept: PULMONOLOGY | Facility: CLINIC | Age: 24
End: 2024-05-28
Payer: COMMERCIAL

## 2024-06-04 ENCOUNTER — OFFICE VISIT (OUTPATIENT)
Dept: PULMONOLOGY | Facility: CLINIC | Age: 24
End: 2024-06-04
Payer: COMMERCIAL

## 2024-06-04 VITALS
OXYGEN SATURATION: 99 % | WEIGHT: 240 LBS | SYSTOLIC BLOOD PRESSURE: 113 MMHG | TEMPERATURE: 98.1 F | HEART RATE: 101 BPM | BODY MASS INDEX: 42.51 KG/M2 | DIASTOLIC BLOOD PRESSURE: 81 MMHG

## 2024-06-04 DIAGNOSIS — J30.9 ALLERGIC RHINITIS, UNSPECIFIED SEASONALITY, UNSPECIFIED TRIGGER: ICD-10-CM

## 2024-06-04 DIAGNOSIS — J45.909 ASTHMA, UNSPECIFIED ASTHMA SEVERITY, UNSPECIFIED WHETHER COMPLICATED, UNSPECIFIED WHETHER PERSISTENT (HHS-HCC): Primary | ICD-10-CM

## 2024-06-04 PROCEDURE — 1036F TOBACCO NON-USER: CPT | Performed by: NURSE PRACTITIONER

## 2024-06-04 PROCEDURE — 99214 OFFICE O/P EST MOD 30 MIN: CPT | Performed by: NURSE PRACTITIONER

## 2024-06-04 PROCEDURE — 3008F BODY MASS INDEX DOCD: CPT | Performed by: NURSE PRACTITIONER

## 2024-06-04 RX ORDER — CETIRIZINE HYDROCHLORIDE 5 MG/1
5 TABLET ORAL DAILY
COMMUNITY
End: 2024-06-04 | Stop reason: WASHOUT

## 2024-06-04 RX ORDER — CETIRIZINE HYDROCHLORIDE 10 MG/1
10 TABLET ORAL DAILY
Qty: 90 TABLET | Refills: 3 | Status: SHIPPED | OUTPATIENT
Start: 2024-06-04 | End: 2025-06-04

## 2024-06-04 ASSESSMENT — ENCOUNTER SYMPTOMS
DEPRESSION: 0
NERVOUS/ANXIOUS: 1
PALPITATIONS: 0
EYE PAIN: 0
DIZZINESS: 0
WEAKNESS: 0
ARTHRALGIAS: 0
MYALGIAS: 0
VOMITING: 0
FATIGUE: 0
ABDOMINAL PAIN: 0
FEVER: 0
NUMBNESS: 0
HEADACHES: 0
BACK PAIN: 0
JOINT SWELLING: 0
VOICE CHANGE: 0
NAUSEA: 0
AGITATION: 0
DIARRHEA: 0
RHINORRHEA: 0
SINUS PRESSURE: 0

## 2024-06-04 ASSESSMENT — PAIN SCALES - GENERAL: PAINLEVEL: 0-NO PAIN

## 2024-06-04 NOTE — PROGRESS NOTES
Patient: Xochitl Banuelos    [id number]  : 2000 -- AGE 23 y.o.    Provider: DEEJAY Parham-CNP     Location [facility]   Service Date: 2024              [facility]  Follow up Visit Note      HISTORY OF PRESENT ILLNESS     The patient's referring provider is: No ref. provider found    HISTORY OF PRESENT ILLNESS   Xochitl Banuelos is a 23 y.o. female (current smoker) who presents to a [facility] for follow up for asthma. I have independently interviewed and examined the patient in the office and reviewed available records. Last seen in clinic on 24.      Current History  Since last visit symbicort - dose increased and feels it has helped. She states she has noticed at night at work after eating she has been more short of breath. She does not feel her reflux has been bothering her. She was started on a thyroid pill, but has had issues with timing of both of those.  She does not need her rescue, but feels its more she notices she is winded with walking or talking. She states its not happening every day -- just here and there enough for her to notice. She has only noticed it at work.  She feels the symbicort is helping. She states she notices a cough at night when she lays down - possibly some post nasal drip. She will wheeze on high pollen days and use her rescue - usually about once a week.  She continue to take her montelukast at bedtime.  She has been taking cetrizine and flonase in the morning (stopped the loratadine). She states if she uses the flonase too much it will give her a nose bleed. She has CAVAZOS at times outside of work.  She denies any SOB at rest or CP. She has a dull ache on the left side -- she feels anxiety related. She denies it being reproducible. Previous intermittent vaping-- stopped Saturday. Seen by Aminata DOMINIQUE with sleep - started on CPAP. She is getting her machine today     ACT  today 22 2/13/24: Since last visit she states she has been doing better overall. She was able to get the symbicort and feels it has been helping. Cough is still there, but better as well. She states its worse at night when she lies down.  She has been using her symbicort BID. She states her cough is dry/ barky. She as not been needing her albuterol often - a few times a week. She denies any wheezing. She feels her exercise endurance is a little better on the symbicort. She denies any SOB at rest or CP. She started the loratadine, but is not sure if it has done much. She still feels she is having post nasal drip. She has been taking her montelukast daily. She feels her GERD is under good control with daily omeprazole. She is back to vaping - she is off and on with it.   ACT today 21    10/24/23: She had an episode of bronchitis in August. She was on a prednisone and a z pack prior that did not work. She then was on augmentin and dexamethasone with little improvement. She feels the dexamethasone made her not feel well and she was not sleeping. She tends to get an episode of bronchitis once a year. She has had pneumonia in the past, but when she was really little. She is concerned because she continues to have residual symptoms from her bronchitis in August.  She took 3 COVID tests which were all negative. She had COVID twice - didn't have chest issues - more so just body aches, and cold symptoms.    -- She states before this bronchitis episode she rarely needed her albuterol. She swam in high school and even then would rarely use her rescue. She states her nebulizer is new with this episode of bronchitis.  She feels the albuterol HFA and the nebulizers don't help a whole lot.   -- She still has a cough - dry and barky. [facility] states she will hear mucous in her chest - not sure if its a wheeze. She has been getting back to the gym. She feels her exercise endurance is not where it was, but is gradually improving. She  was working out 6 days a week prior to her bronchitis. She rarely will have SOB at rest. She denies any CP. She is on montelukast daily (has since childhood). She does have some post nasal drip, but denies any nasal congestion or runny nose. She has good control of her GERD with daily omeprazole.   ACT today 17     Previous pulmonary history: She was previously told she has asthma.    Inhalers/nebulized medications: albuterol     Hospitalization History: She has not been hospitalized over the last year for breathing related problem.    Sleep history: She has been told she snores. Possible witnessed apneas. She does not wake up feeling rested.      ALLERGIES AND MEDICATIONS     ALLERGIES  No Known Allergies    MEDICATIONS  Current Outpatient Medications   Medication Sig Dispense Refill    albuterol 90 mcg/actuation inhaler Inhale 2 puffs 4 times a day. (Patient taking differently: Inhale 1-2 puffs. Every 4 to 6 hours as needed as directed.) 18 g 3    budesonide-formoteroL (Symbicort) 160-4.5 mcg/actuation inhaler Inhale 2 puffs 2 times a day. Rinse mouth with water after use to reduce aftertaste and incidence of candidiasis. Do not swallow. 10.2 g 3    cetirizine (ZyrTEC) 5 mg tablet Take 1 tablet (5 mg) by mouth once daily.      cyanocobalamin (Vitamin B-12) 1,000 mcg/mL injection Inject 1 mL (1,000 mcg) into the muscle every 30 (thirty) days. 4 mL 0    levothyroxine (Synthroid, Levoxyl) 50 mcg tablet Take 1 tablet (50 mcg) by mouth once daily. 30 tablet 3    montelukast (Singulair) 10 mg tablet TAKE 1 TABLET (10 MG) BY MOUTH ONCE DAILY IN THE EVENING. 90 tablet 1    omeprazole (PriLOSEC) 20 mg DR capsule TAKE 1 CAPSULE (20 MG) BY MOUTH ONCE DAILY DO NOT CRUSH OR CHEW 90 capsule 1    topiramate (Topamax) 50 mg tablet Take 1 tablet (50 mg) by mouth once daily.      loratadine (Claritin) 10 mg tablet TAKE 1 TABLET (10 MG) BY MOUTH ONCE DAILY. TAKE IN THE EVENING BEFORE BEDTIME (Patient not taking: Reported on  5/14/2024) 90 tablet 1     No current facility-administered medications for this visit.         PAST HISTORY     PAST MEDICAL HISTORY  - asthma   - GERD   - seasonal allergies     PAST SURGICAL HISTORY  Past Surgical History:   Procedure Laterality Date    OTHER SURGICAL HISTORY  10/27/2016    Oral Surgery       IMMUNIZATION HISTORY  Immunization History   Administered Date(s) Administered    DTaP vaccine, pediatric  (INFANRIX) 02/01/1999, 04/13/1999, 06/07/1999, 03/20/2000, 12/02/2003    Flu vaccine (IIV4), preservative free *Check age/dose* 11/06/2014, 01/16/2017, 09/05/2018    Flu vaccine, quadrivalent, no egg protein, age 6 month or greater (FLUCELVAX) 09/27/2022    HPV 9-valent vaccine (GARDASIL 9) 09/17/2015    HPV, Quadrivalent 10/25/2013, 11/06/2014    Hepatitis B vaccine, adult (RECOMBIVAX, ENGERIX) 04/23/2021    Hepatitis B vaccine, pediatric/adolescent (RECOMBIVAX, ENGERIX) 2000    HiB, unspecified 04/13/1999    Hib / Hep B 02/01/1999, 12/06/1999    Influenza, Unspecified 10/30/2003, 12/02/2003, 12/06/2005, 12/07/2006, 12/13/2007, 12/16/2008, 09/21/2009, 12/02/2010, 12/01/2011, 12/10/2012, 10/25/2013    Influenza, injectable, MDCK, quadrivalent 09/24/2019    Influenza, injectable, quadrivalent 09/30/2021    Influenza, seasonal, injectable 11/05/2015    MMR vaccine, subcutaneous (MMR II) 12/06/1999, 12/16/2002, 07/27/2018, 04/19/2021    Meningococcal ACWY, unspecified 11/05/2015    Meningococcal ACWY-D (Menactra) 4-valent conjugate vaccine 11/05/2015    Meningococcal MCV4, Unspecified 12/02/2010    Moderna SARS-CoV-2 Vaccination 01/22/2021, 02/16/2021    Novel influenza-H1N1-09, preservative-free 12/18/2009, 09/14/2020    Pfizer Purple Cap SARS-CoV-2 01/21/2022    Poliovirus vaccine, subcutaneous (IPOL) 02/01/1999, 04/13/1999, 03/20/2000, 12/02/2003    Rotavirus pentavalent vaccine, oral (ROTATEQ) 04/13/1999, 06/07/1999    Tdap vaccine, age 7 year and older (BOOSTRIX, ADACEL) 12/02/2010, 11/11/2020,  2021    Varicella vaccine, subcutaneous (VARIVAX) 2000, 2007       SOCIAL HISTORY  Smokin-24 cigarettes 1/3 ppd-> vape . Most recently vaping. On and off - none this week so far   ETOH: 10 drinks a week   Illicit drugs: none     OCCUPATIONAL/ENVIRONMENTAL HISTORY  Currently works as an [employer]     FAMILY HISTORY  Family History   Problem Relation Name Age of Onset    Hypertension Mother      Hypothyroidism Other       Asthma - paternal aunt   BECKI - mother (before weight loss surgery)   Lung cancer - uncle     RESULTS/DATA     Pulmonary Function Test Results     23 - FEV1/FVC 0.73/ FEV1 3.98 (133%)/ FVC 5.48 (161%)    FENO 23 - 11ppb     Chest Radiograph     XR chest 2 views 2023- Impression  No radiographic evidence of an acute cardiopulmonary process.       Chest CT Scan     None on record        Echocardiogram     None on record        Other testing/ Labs      REVIEW OF SYSTEMS     REVIEW OF SYSTEMS  Review of Systems   Constitutional:  Negative for fatigue and fever.   HENT:  Negative for congestion, postnasal drip, rhinorrhea, sinus pressure and voice change.    Eyes:  Negative for pain and visual disturbance.   Cardiovascular:  Positive for chest pain. Negative for palpitations and leg swelling.   Gastrointestinal:  Negative for abdominal pain, diarrhea, nausea and vomiting.   Endocrine: Negative for cold intolerance and heat intolerance.   Musculoskeletal:  Negative for arthralgias, back pain, joint swelling and myalgias.   Skin:  Negative for rash.   Neurological:  Negative for dizziness, weakness, numbness and headaches.   Psychiatric/Behavioral:  Negative for agitation. The patient is nervous/anxious.          PHYSICAL EXAM     VITAL SIGNS: /81   Pulse 101   Temp 36.7 °C (98.1 °F)   Wt 109 kg (240 lb)   SpO2 99%   BMI 42.51 kg/m²      CURRENT WEIGHT: [unfilled]  BMI: [unfilled]  PREVIOUS WEIGHTS:  Wt Readings from Last 3 Encounters:   24 109  kg (240 lb)   05/14/24 112 kg (246 lb 6.4 oz)   03/05/24 116 kg (255 lb)       Physical Exam  Vitals reviewed.   Constitutional:       General: She is not in acute distress.     Appearance: Normal appearance. She is not ill-appearing or toxic-appearing.   HENT:      Head: Normocephalic.      Nose: No rhinorrhea.   Cardiovascular:      Rate and Rhythm: Normal rate and regular rhythm.      Heart sounds: Normal heart sounds.   Pulmonary:      Effort: Pulmonary effort is normal. No respiratory distress.      Breath sounds: Normal breath sounds. No stridor. No wheezing, rhonchi or rales.   Abdominal:      General: Abdomen is flat.   Musculoskeletal:         General: Normal range of motion.      Right lower leg: No edema.      Left lower leg: No edema.   Skin:     General: Skin is warm and dry.      Nails: There is no clubbing.   Neurological:      General: No focal deficit present.      Mental Status: She is alert and oriented to person, place, and time.   Psychiatric:         Mood and Affect: Mood normal.         Behavior: Behavior normal.         Judgment: Judgment normal.         ASSESSMENT/PLAN     Asthma: PFTs with mild obstruction (0.73<0.77)- FEV1 133%   - continue albuterol 2 puffs or albuterol nebulizers every 4-6 hours as needed for shortness of breath   - continue symbicort 160/4.5 2 puffs twice daily - rinse mouth out afterwards     Allergic rhinitis:   - continue montelukast (singulair) 10mg daily at bedtime   - continue cetirizine (zyrtec) 10mg daily - take at bedtime   - continue flonase - daily - aim towards your ear   - start nasal saline 2-3 x per day as needed     Snoring:   - continue to follow with sleep medicine     Smoking cessation: still intermittently vaping   - keep up the good work!   - try using suckers/ hard candy instead of your vape   > 5 minutes smoking cessation counseling     Thank you for visiting the Pulmonary clinic today!   Return to clinic 6  months  or sooner if needed   Camelia  Stephanie CNP  My office -  [phone]- Janae is my nurse.   Radiology scheduling [phone]   Appointment scheduling [phone]   Pulmonary function testing - [phone]

## 2024-06-04 NOTE — PATIENT INSTRUCTIONS
Asthma: PFTs with mild obstruction (0.73<0.77)- FEV1 133%   - continue albuterol 2 puffs or albuterol nebulizers every 4-6 hours as needed for shortness of breath   - continue symbicort 160/4.5 2 puffs twice daily - rinse mouth out afterwards     Allergic rhinitis:   - continue montelukast (singulair) 10mg daily at bedtime   - continue cetirizine (zyrtec) 10mg daily - take at bedtime   - continue flonase - daily - aim towards your ear   - start nasal saline 2-3 x per day as needed     Snoring:   - continue to follow with sleep medicine     Smoking cessation: still intermittently vaping   - keep up the good work!   > 5 minutes smoking cessation counseling     Thank you for visiting the Pulmonary clinic today!   Return to clinic 6 months  or sooner if needed   Camelia Brown CNP  My office -  [phone]- Janae is my nurse.   Radiology scheduling [phone]   Appointment scheduling [phone]   Pulmonary function testing - [phone]

## 2024-06-14 DIAGNOSIS — E03.9 HYPOTHYROIDISM, UNSPECIFIED TYPE: ICD-10-CM

## 2024-06-14 DIAGNOSIS — J45.909 ASTHMA, UNSPECIFIED ASTHMA SEVERITY, UNSPECIFIED WHETHER COMPLICATED, UNSPECIFIED WHETHER PERSISTENT (HHS-HCC): ICD-10-CM

## 2024-06-14 DIAGNOSIS — E55.9 VITAMIN D DEFICIENCY: ICD-10-CM

## 2024-06-14 DIAGNOSIS — E53.8 VITAMIN B12 DEFICIENCY: ICD-10-CM

## 2024-06-14 RX ORDER — LEVOTHYROXINE SODIUM 50 UG/1
50 TABLET ORAL DAILY
Qty: 90 TABLET | Refills: 1 | Status: SHIPPED | OUTPATIENT
Start: 2024-06-14 | End: 2024-12-11

## 2024-06-14 RX ORDER — CYANOCOBALAMIN 1000 UG/ML
1000 INJECTION, SOLUTION INTRAMUSCULAR; SUBCUTANEOUS
Qty: 1 ML | Refills: 3 | Status: SHIPPED | OUTPATIENT
Start: 2024-06-14

## 2024-06-14 RX ORDER — BUDESONIDE AND FORMOTEROL FUMARATE DIHYDRATE 160; 4.5 UG/1; UG/1
AEROSOL RESPIRATORY (INHALATION)
Qty: 10.2 G | Refills: 10 | Status: SHIPPED | OUTPATIENT
Start: 2024-06-14

## 2024-06-14 NOTE — TELEPHONE ENCOUNTER
Last Visit 2/23/24  - due to recheck b12, d, and tsh    Requested Prescriptions     Pending Prescriptions Disp Refills    levothyroxine (Synthroid, Levoxyl) 50 mcg tablet [Pharmacy Med Name: LEVOTHYROXINE 50 MCG TABLET] 90 tablet 1     Sig: Take 1 tablet (50 mcg) by mouth once daily.     Refused Prescriptions Disp Refills    cyanocobalamin (Vitamin B-12) 1,000 mcg/mL injection [Pharmacy Med Name: CYANOCOBALAMIN 1,000 MCG/ML VL] 1 mL 3     Sig: Inject 1 mL (1,000 mcg) into the muscle every 30 (thirty) days.

## 2024-06-20 ENCOUNTER — LAB (OUTPATIENT)
Dept: LAB | Facility: LAB | Age: 24
End: 2024-06-20
Payer: COMMERCIAL

## 2024-06-20 DIAGNOSIS — E53.8 VITAMIN B12 DEFICIENCY: ICD-10-CM

## 2024-06-20 DIAGNOSIS — E03.9 HYPOTHYROIDISM, UNSPECIFIED TYPE: ICD-10-CM

## 2024-06-20 DIAGNOSIS — E55.9 VITAMIN D DEFICIENCY: ICD-10-CM

## 2024-06-20 LAB
25(OH)D3 SERPL-MCNC: 26 NG/ML (ref 30–100)
TSH SERPL-ACNC: 2.23 MIU/L (ref 0.44–3.98)
VIT B12 SERPL-MCNC: 553 PG/ML (ref 211–911)

## 2024-06-20 PROCEDURE — 36415 COLL VENOUS BLD VENIPUNCTURE: CPT

## 2024-06-20 PROCEDURE — 82306 VITAMIN D 25 HYDROXY: CPT

## 2024-06-20 PROCEDURE — 82607 VITAMIN B-12: CPT

## 2024-06-20 PROCEDURE — 84443 ASSAY THYROID STIM HORMONE: CPT

## 2024-07-03 NOTE — PROGRESS NOTES
Patient: Xochitl Banuelos  : 2000 AGE: 23 y.o. SEX:female   MRN: [id number]   Provider: MUNDO Salcedo     Location [facility]   Service Date: 2024     PCP: Ronald Bradshaw DO   Referred by: No ref. provider found          [facility]  Follow Up Visit Note    Virtual or Telephone Consent  An interactive audio and video telecommunication system which permits real time communications between the patient (at the originating site) and provider (at the distant site) was utilized to provide this telehealth service.   Verbal consent was requested and obtained from Xochitl Banuelos on this date, 24 for a telehealth visit.      HISTORY OF PRESENT ILLNESS     Xochitl Banuelos is a 23 y.o. female with a  h/o mild BECKI, Obesity and asthma, GERD, hypothyroidism, nicotine dependence who presents to [facility] for a follow up.     24: First follow up since starting PAP therapy about one month ago. Reports she feels more exhausted since starting use. Using PAP every night with no benefit. Xochitl is more tired especially in the later evenings since starting PAP.    24: Here to review HSAT results. Sleep is the same as prior visit, continues to wake un refreshed. Works second shift, sleep schedule 2am-11:30am. Willing to try CPAP. ---> APAP 5-15 ordered through [facility].     3/5/24: NPV, referred by pulmonology with concerns of BECKI evaluation. Reports extremely loud snoring, un freshed sleep, morning headaches, EDS. ----> HSAT ordered      SLEEP STUDY HISTORY (personally reviewed raw data such as interpretation report, data sheet, hypnogram, and titration table if available and applicable)  - HSAT 3/16/24- showing AHI 8.7, SpO2 negin 80.4%        Media Information        SLEEP-WAKE SCHEDULE    Sleep schedule  on weekdays / work days:  Usual Bedtime:  2am  Subjective sleep latency: <30min  # of awakenings: No  Wake time:   11:30am  Naps: No  Average sleep duration (excluding naps): 8.5-9 hours     Sleep schedule on weekends/non work days :  same as weekdays    SLEEP ENVIRONMENT  Sleep status: sleeps alone  Preferred sleep position: side  Room is dark: Yes  Room is quiet: Yes  Room is cool: Yes  Bed comfort: good    SLEEP HABITS  Caffeine consumption: Yes, 1-2 cups/day  Alcohol consumption: Yes, rarely (occasional)  Smoking: Yes, vape nicotine   Marijuana: No  Sleep aids: melatonin 5mg - 2-3x/week    SLEEP ROS  Sleep Initiation: no problems going to sleep    Sleep Maintenance: denies problematic awakenings    Breathing during sleep: snoring, nasal congestion, and mouth breathing      Sleep-related behaviors: DENIES  hypnagogic/hypnopompic hallucinations  sleep paralysis  sleep attacks  symptoms of cataplexy  sleep walking  kicking, punching, or acting out dreams  sleep eating  nightmares      Daytime Symptoms  On awakening patient reports: wake unrefreshed, morning headaches, morning dry mouth,  stimulant use (see med list), and teeth grinding- wears mouth guard for by dentist     Patient report some daytime symptoms including: DAYTIME SYMPTOMS: excessive daytime sleepiness sleep inertia difficulty with memory or concentration during the day  Patient denies daytime symptoms including: Denies: late to work/school due to sleepiness irritability during the day feeling sleepy when driving  Fatigue: symptoms bothersome, but easily able to carry out all usual work/school/family activities    RLS screen: Patient denies RLS symptoms.    WEIGHT: lost 31lbs in 1 year      ESS: 12      REVIEW OF SYSTEMS     All other systems have been reviewed and are negative.    ALLERGIES     No Known Allergies    MEDICATIONS     Current Outpatient Medications   Medication Sig Dispense Refill    albuterol 90 mcg/actuation inhaler Inhale 2 puffs 4 times a day. (Patient taking differently: Inhale 1-2 puffs. Every 4 to 6 hours as needed as directed.) 18 g 3     "amoxicillin (Amoxil) 500 mg capsule Take 1 capsule (500 mg) by mouth 2 times a day for 10 days. 20 capsule 0    budesonide-formoteroL (Symbicort) 160-4.5 mcg/actuation inhaler PLEASE SEE ATTACHED FOR DETAILED DIRECTIONS 10.2 g 10    cetirizine (ZyrTEC) 10 mg tablet Take 1 tablet (10 mg) by mouth once daily. Take in the evening before bedtime 90 tablet 3    levothyroxine (Synthroid, Levoxyl) 50 mcg tablet Take 1 tablet (50 mcg) by mouth once daily. 90 tablet 1    montelukast (Singulair) 10 mg tablet TAKE 1 TABLET (10 MG) BY MOUTH ONCE DAILY IN THE EVENING. 90 tablet 1    omeprazole (PriLOSEC) 20 mg DR capsule TAKE 1 CAPSULE (20 MG) BY MOUTH ONCE DAILY DO NOT CRUSH OR CHEW 90 capsule 1    topiramate (Topamax) 50 mg tablet Take 1 tablet (50 mg) by mouth once daily.      cyanocobalamin (Vitamin B-12) 1,000 mcg/mL injection INJECT 1 ML (1,000 MCG) INTO THE MUSCLE EVERY 30 (THIRTY) DAYS. (Patient not taking: Reported on 7/4/2024) 1 mL 3     No current facility-administered medications for this visit.       PAST HISTORIES     PERTINENT PAST MEDICAL HISTORY: See HPI    PERTINENT PAST SURGICAL HISTORY for Sleep Medicine:  non-contributory    PERTINENT FAMILY HISTORY for Sleep Medicine:  sleep apnea- mother     PERTINENT SOCIAL HISTORY:  She  reports that she has never smoked. She has been exposed to tobacco smoke. She uses smokeless tobacco. She reports current alcohol use. She reports that she does not use drugs. 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 No significant changes unless documented in the pertinent chart section. Updates made when necessary.     PHYSICAL EXAM     VITAL SIGNS: Ht 1.6 m (5' 3\")   Wt 109 kg (240 lb)   BMI 42.51 kg/m²     CURRENT WEIGHT:   Vitals:    07/08/24 0930   Weight: 109 kg (240 lb)       PREVIOUS WEIGHTS:  Wt " "Readings from Last 3 Encounters:   07/08/24 109 kg (240 lb)   07/04/24 109 kg (240 lb)   06/04/24 109 kg (240 lb)     Limited telehealth examination  PHYSICAL EXAMINATION  General appearance: awake alert in NAD  Affect: normal  HEENT: Nasal congestion absent  Lungs: no cough.  Neuro: normal speech      RESULTS/DATA     No results found for: \"IRON\", \"TRANSFERRIN\", \"IRONSAT\", \"TIBC\", \"FERRITIN\"    Bicarbonate   Date Value Ref Range Status   02/23/2024 23 21 - 32 mmol/L Final       ASSESSMENT/PLAN     Ms. Banuleos is a 23 y.o. female and She was referred to the [facility] for evaluation of BECKI    Problem List, Orders, Assessment, Recommendations:    # BECKI, mild  - HSAT 3/16/24- showing AHI 8.7, SpO2 negin 80.4%     7/8/24:  - Retrieved and personally reviewed recent PAP adherence download data today. See HPI.  - excellent compliance to PAP therapy, residual AHI at goal but no benefit with PAP use  - She is more tired with PAP use, ESS 12 today (before PAP her ESS was 7)  - Discussed optional treatment of mild BECKI, despite no benefit with consistent use, may discontinue CPAP and return machine to [facility]ME- [facility]  - Encouraged weight loss coupled with positional therapy  - Advised office if any increase in EDS or sleep disturbances  - diet, exercise, and weight loss were emphasized today in clinic, as were non-supine sleep, avoiding alcohol in the late evening, and driving or operating heavy machinery when sleepy. Patient verbalized understanding.     5/14/24:  - will start APAP 5-15 cwp via [facility]  - Patient fit with AIrFit P10 XS, sample mask provided in office today  - Patient has nose ring, if failed CPAP would consider OAT in the future     #Obesity  BMI Readings from Last 1 Encounters:   07/08/24 42.51 kg/m²     - Encouraged healthy weight loss via diet and exercise  - Weight loss can help in the long term treatment of BECKI.  - Defer management to bariatrics (on toprimate)     # ALLERGIC " RHINITIS/SEASONAL ALLERGIES/NASAL CONGESTION  - Start fluticasone nasal spray 2 sprays per nostril once daily 1 hour before bedtime.  - If there is inadequate or lack of improvement on the above intranasal steroid spray, consider adding Azelastine nasal spray, 2 sprays per nostril once daily in the morning.   - Currently on zyrtec and montelukast   - Educated patient on proper use of intranasal sprays.        #Asthma/vaping nicotine  - encouraged smoking cessation   - on meds, defer management to pulmonary- Camelia Brown    All of patient's questions were answered. She verbalizes understanding and agreement with my assessment and plan.    Disposition    Return to clinic as needed

## 2024-07-04 ENCOUNTER — OFFICE VISIT (OUTPATIENT)
Dept: URGENT CARE | Facility: CLINIC | Age: 24
End: 2024-07-04
Payer: COMMERCIAL

## 2024-07-04 VITALS
OXYGEN SATURATION: 97 % | HEART RATE: 105 BPM | DIASTOLIC BLOOD PRESSURE: 82 MMHG | WEIGHT: 240 LBS | BODY MASS INDEX: 42.51 KG/M2 | SYSTOLIC BLOOD PRESSURE: 114 MMHG | TEMPERATURE: 98.5 F | RESPIRATION RATE: 20 BRPM

## 2024-07-04 DIAGNOSIS — J03.90 TONSILLITIS: Primary | ICD-10-CM

## 2024-07-04 LAB — POC RAPID STREP: NEGATIVE

## 2024-07-04 PROCEDURE — 87880 STREP A ASSAY W/OPTIC: CPT | Performed by: PHYSICIAN ASSISTANT

## 2024-07-04 PROCEDURE — 99203 OFFICE O/P NEW LOW 30 MIN: CPT | Performed by: PHYSICIAN ASSISTANT

## 2024-07-04 PROCEDURE — 3008F BODY MASS INDEX DOCD: CPT | Performed by: PHYSICIAN ASSISTANT

## 2024-07-04 RX ORDER — AMOXICILLIN 500 MG/1
500 CAPSULE ORAL 2 TIMES DAILY
Qty: 20 CAPSULE | Refills: 0 | Status: SHIPPED | OUTPATIENT
Start: 2024-07-04 | End: 2024-07-14

## 2024-07-04 ASSESSMENT — ENCOUNTER SYMPTOMS
COUGH: 1
SORE THROAT: 1
NEUROLOGICAL NEGATIVE: 1
CARDIOVASCULAR NEGATIVE: 1
GASTROINTESTINAL NEGATIVE: 1
ENDOCRINE NEGATIVE: 1
PSYCHIATRIC NEGATIVE: 1
MUSCULOSKELETAL NEGATIVE: 1
ALLERGIC/IMMUNOLOGIC NEGATIVE: 1
FEVER: 0
EYES NEGATIVE: 1
HEMATOLOGIC/LYMPHATIC NEGATIVE: 1
SINUS COMPLAINT: 1

## 2024-07-04 NOTE — PATIENT INSTRUCTIONS
Motrin or tylenol as directed for pain or fever  Increase clear fluids and soft diet for swallowing discomfort  Pcp follow up this week if not improving or worsening  ER visit anytime 24/7 for acute worsening or changing condition

## 2024-07-04 NOTE — PROGRESS NOTES
Subjective   Patient ID: Xochitl Banuelos is a 23 y.o. female.      History provided by:  Patient   used: No    Sinus Problem  Associated symptoms: congestion, cough and sore throat    Associated symptoms: no ear pain and no fever      This is a 23 yr old female here for sore throat and white patches on tonsils, sinus PND and cough productive of yellow sputum x 2 days. No fever or ear pain.     Review of Systems   Constitutional:  Negative for fever.   HENT:  Positive for congestion, postnasal drip and sore throat. Negative for ear pain.    Eyes: Negative.    Respiratory:  Positive for cough.    Cardiovascular: Negative.    Gastrointestinal: Negative.    Endocrine: Negative.    Genitourinary: Negative.    Musculoskeletal: Negative.    Skin: Negative.    Allergic/Immunologic: Negative.    Neurological: Negative.    Hematological: Negative.    Psychiatric/Behavioral: Negative.     All other systems reviewed and are negative.  /82   Pulse 105   Temp 36.9 °C (98.5 °F)   Resp 20   Wt 109 kg (240 lb)   SpO2 97%   BMI 42.51 kg/m²     Objective   Physical Exam  Vitals and nursing note reviewed.   Constitutional:       Appearance: Normal appearance.   HENT:      Head: Normocephalic and atraumatic.      Right Ear: Tympanic membrane and ear canal normal.      Left Ear: Tympanic membrane and ear canal normal.      Mouth/Throat:      Mouth: Mucous membranes are moist.      Pharynx: Oropharyngeal exudate and posterior oropharyngeal erythema present.   Cardiovascular:      Rate and Rhythm: Normal rate and regular rhythm.   Pulmonary:      Effort: Pulmonary effort is normal.      Breath sounds: Normal breath sounds.   Musculoskeletal:      Cervical back: Neck supple.   Lymphadenopathy:      Cervical: No cervical adenopathy.   Skin:     General: Skin is warm and dry.   Neurological:      General: No focal deficit present.      Mental Status: She is alert and oriented to person, place, and time.    Psychiatric:         Mood and Affect: Mood normal.         Behavior: Behavior normal.     Rapid strep-negative    Assessment:  tonsillitis    Plan:  Amox 500 mg bid x 10 days  Motrin or tylenol as directed for pain or fever  Increase clear fluids and soft diet for swallowing discomfort  Pcp follow up this week if not improving or worsening  ER visit anytime 24/7 for acute worsening or changing condition

## 2024-07-05 PROBLEM — G47.30 SLEEP DISORDER BREATHING: Status: RESOLVED | Noted: 2024-03-04 | Resolved: 2024-07-05

## 2024-07-08 ENCOUNTER — APPOINTMENT (OUTPATIENT)
Dept: SLEEP MEDICINE | Facility: CLINIC | Age: 24
End: 2024-07-08
Payer: COMMERCIAL

## 2024-07-08 VITALS — BODY MASS INDEX: 42.52 KG/M2 | WEIGHT: 240 LBS | HEIGHT: 63 IN

## 2024-07-08 DIAGNOSIS — G47.33 OSA (OBSTRUCTIVE SLEEP APNEA): Primary | ICD-10-CM

## 2024-07-08 DIAGNOSIS — E66.01 CLASS 3 SEVERE OBESITY DUE TO EXCESS CALORIES WITH BODY MASS INDEX (BMI) OF 40.0 TO 44.9 IN ADULT, UNSPECIFIED WHETHER SERIOUS COMORBIDITY PRESENT (MULTI): ICD-10-CM

## 2024-07-08 PROCEDURE — 99213 OFFICE O/P EST LOW 20 MIN: CPT | Performed by: NURSE PRACTITIONER

## 2024-07-08 PROCEDURE — 3008F BODY MASS INDEX DOCD: CPT | Performed by: NURSE PRACTITIONER

## 2024-07-08 ASSESSMENT — SLEEP AND FATIGUE QUESTIONNAIRES
SITTING AND RIDING IN A CAR OR BUS FOR ABOUT HALF AN HOUR: 2
HOW LIKELY ARE YOU TO NOD OFF OR FALL ASLEEP IN A CAR, WHILE STOPPED FOR A FEW MINUTES IN TRAFFIC: SLIGHT CHANCE OF DOZING
SITTING AND READING: 1
SITING INACTIVE IN A PUBLIC PLACE LIKE A CLASS ROOM OR A MOVIE THEATER: SLIGHT CHANCE OF DOZING
HOW LIKELY ARE YOU TO NOD OFF OR FALL ASLEEP WHILE WATCHING TV: MODERATE CHANCE OF DOZING
LYING DOWN TO REST OR NAP IN THE AFTERNOON: 3
ESS-CHAD TOTAL SCORE: 12
SITTING IN A CLASSROOM AT SCHOOL DURING THE MORNING: 1
SITTING QUITELY BY YOURSELF AFTER LUNCH: 1
HOW LIKELY ARE YOU TO NOD OFF OR FALL ASLEEP WHEN YOU ARE A PASSENGER IN A CAR FOR AN HOUR WITHOUT A BREAK: MODERATE CHANCE OF DOZING
HOW LIKELY ARE YOU TO NOD OFF OR FALL ASLEEP WHILE SITTING AND READING: MODERATE CHANCE OF DOZING
HOW LIKELY ARE YOU TO NOD OFF OR FALL ASLEEP WHILE SITTING AND TALKING TO SOMEONE: SLIGHT CHANCE OF DOZING
ESS-CHAD TOTAL SCORE: 12
HOW LIKELY ARE YOU TO NOD OFF OR FALL ASLEEP WHILE SITTING QUIETLY AFTER LUNCH WITHOUT ALCOHOL: SLIGHT CHANCE OF DOZING
HOW LIKELY ARE YOU TO NOD OFF OR FALL ASLEEP WHILE LYING DOWN TO REST IN THE AFTERNOON WHEN CIRCUMSTANCES PERMIT: MODERATE CHANCE OF DOZING
SITTING AND EATING A MEAL: 1
SITTING AND WATCHING TV OR A VIDEO: 2
SITTING AND TALKING TO SOMEONE: 1

## 2024-07-08 ASSESSMENT — ENCOUNTER SYMPTOMS
OCCASIONAL FEELINGS OF UNSTEADINESS: 0
LOSS OF SENSATION IN FEET: 0
DEPRESSION: 0

## 2024-07-08 ASSESSMENT — PAIN SCALES - GENERAL: PAINLEVEL: 0-NO PAIN

## 2024-07-11 ENCOUNTER — PATIENT MESSAGE (OUTPATIENT)
Dept: PRIMARY CARE | Facility: CLINIC | Age: 24
End: 2024-07-11
Payer: COMMERCIAL

## 2024-07-17 ENCOUNTER — APPOINTMENT (OUTPATIENT)
Dept: PRIMARY CARE | Facility: CLINIC | Age: 24
End: 2024-07-17
Payer: COMMERCIAL

## 2024-07-29 ENCOUNTER — APPOINTMENT (OUTPATIENT)
Dept: PRIMARY CARE | Facility: CLINIC | Age: 24
End: 2024-07-29
Payer: COMMERCIAL

## 2024-07-29 ENCOUNTER — TELEPHONE (OUTPATIENT)
Dept: PRIMARY CARE | Facility: CLINIC | Age: 24
End: 2024-07-29

## 2024-07-29 DIAGNOSIS — R53.82 CHRONIC FATIGUE: Primary | ICD-10-CM

## 2024-07-29 DIAGNOSIS — E66.01 CLASS 3 SEVERE OBESITY DUE TO EXCESS CALORIES WITHOUT SERIOUS COMORBIDITY WITH BODY MASS INDEX (BMI) OF 40.0 TO 44.9 IN ADULT (MULTI): ICD-10-CM

## 2024-07-29 DIAGNOSIS — R51.9 NONINTRACTABLE HEADACHE, UNSPECIFIED CHRONICITY PATTERN, UNSPECIFIED HEADACHE TYPE: ICD-10-CM

## 2024-07-29 DIAGNOSIS — Z72.0 VAPES NICOTINE CONTAINING SUBSTANCE: ICD-10-CM

## 2024-07-29 PROCEDURE — 99213 OFFICE O/P EST LOW 20 MIN: CPT | Performed by: FAMILY MEDICINE

## 2024-07-29 ASSESSMENT — ENCOUNTER SYMPTOMS
DEPRESSION: 0
OCCASIONAL FEELINGS OF UNSTEADINESS: 0
LOSS OF SENSATION IN FEET: 0

## 2024-07-29 NOTE — TELEPHONE ENCOUNTER
I just wanted to let you know that the patient was changed to a virtual appointment.  Patient tested positive for Covid.        Thank You

## 2024-07-29 NOTE — PROGRESS NOTES
"Subjective   Patient ID: Xochitl Banuelos is a 23 y.o. female who presents for Headache (X a few months. Gets headaches a few times a week, lasting until she takes medications.  And brainfog).    HPI  Patient is seen today as a telemedicine visit rendered via realtime interactive audio/video [employer] services as we are currently in the middle of a coronavirus pandemic worldwide. The patient was informed about the telehealth clinical encounter including benefits to avoiding travel and limitations to the assessment. In person care may be recommended if needed. Telehealth sessions are not being recorded and personal health information is protected.    Patient presents today from home having tested positive for COVID Saturday, 7/27/2024.  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  She believes that is where she got COVID from.    Her visit was scheduled today prior to getting COVID and the main reason for the visit today is extreme fatigue and exhaustion and brain fog that she has been having for the past several months.  She has been on Topamax 50 mg for about a year and a half as an appetite suppressant and claims she never had problems with it before.  She is on a new birth control pill as of February of this year and states her periods are still somewhat \"wonky\".  She states the exhaustion has come on gradually over the past few months and by evening she finds it hard to function and by 8 PM she is exhausted and has to go to bed.    Patient also has had an increase in headaches possibly with the onset of her levothyroxine which was started back in February.  She states the headaches are always in the right temple area.    Review of Systems   All other systems reviewed and are negative.      Objective   Vitals:  There were no vitals taken for this visit.    Physical Exam    Assessment/Plan   Problem List Items Addressed This Visit       Fatigue - Primary    Class 3 severe obesity due to excess " calories with body mass index (BMI) of 40.0 to 44.9 in adult (Multi)    Vapes nicotine containing substance    Nonintractable headache   Patient instructed to discontinue the Topamax for the next 2 weeks and contact me in 2 weeks with an update on her tiredness and fatigue and headaches.  Medication list reconciled.  Thank you for visiting today!      Professional services: Some of this note was completed using Dragon voice technology and sometimes the software misinterprets words. This may include unintended errors with respect to translation of words, typographical errors or grammar errors which may not have been identified prior to finalization of the chart note. Please take this into account when reading the note. Thank you.             Ronald Bradshaw DO 07/29/24 4:57 PM

## 2024-08-23 ENCOUNTER — APPOINTMENT (OUTPATIENT)
Dept: PRIMARY CARE | Facility: CLINIC | Age: 24
End: 2024-08-23
Payer: COMMERCIAL

## 2024-08-26 ENCOUNTER — APPOINTMENT (OUTPATIENT)
Dept: PRIMARY CARE | Facility: CLINIC | Age: 24
End: 2024-08-26
Payer: COMMERCIAL

## 2024-08-26 VITALS
SYSTOLIC BLOOD PRESSURE: 115 MMHG | BODY MASS INDEX: 41.98 KG/M2 | DIASTOLIC BLOOD PRESSURE: 80 MMHG | TEMPERATURE: 99.8 F | OXYGEN SATURATION: 96 % | HEART RATE: 97 BPM | WEIGHT: 237 LBS | RESPIRATION RATE: 20 BRPM

## 2024-08-26 DIAGNOSIS — E66.01 MORBID OBESITY WITH BMI OF 40.0-44.9, ADULT (MULTI): Primary | ICD-10-CM

## 2024-08-26 DIAGNOSIS — F41.9 ANXIETY: ICD-10-CM

## 2024-08-26 DIAGNOSIS — F32.A DEPRESSION, UNSPECIFIED DEPRESSION TYPE: ICD-10-CM

## 2024-08-26 PROCEDURE — 99214 OFFICE O/P EST MOD 30 MIN: CPT | Performed by: FAMILY MEDICINE

## 2024-08-26 RX ORDER — BUPROPION HYDROCHLORIDE 75 MG/1
75 TABLET ORAL 2 TIMES DAILY
Qty: 60 TABLET | Refills: 1 | Status: SHIPPED | OUTPATIENT
Start: 2024-08-26 | End: 2025-08-26

## 2024-08-26 ASSESSMENT — ENCOUNTER SYMPTOMS
LOSS OF SENSATION IN FEET: 0
OCCASIONAL FEELINGS OF UNSTEADINESS: 0
DEPRESSION: 0

## 2024-08-26 ASSESSMENT — PATIENT HEALTH QUESTIONNAIRE - PHQ9
1. LITTLE INTEREST OR PLEASURE IN DOING THINGS: NOT AT ALL
SUM OF ALL RESPONSES TO PHQ9 QUESTIONS 1 AND 2: 0
2. FEELING DOWN, DEPRESSED OR HOPELESS: NOT AT ALL

## 2024-08-26 NOTE — PROGRESS NOTES
Subjective   Patient ID: Xochitl Banuelos is a 23 y.o. female who presents for Follow-up (Brain fog, tired for approx a few months. Stopped taking the topamax about 4 weeks ago but has not improved. She has also been anxious lately. ).    HPI  Patient comes in today for follow-up on her fatigue and brain fog symptoms that she had last visit.  We did have her stop Topamax after that visit and she has not noticed any symptoms or increase in symptoms since stopping the Topamax but it has not helped the fatigue or brain fog either.  Patient given standard psych evaluation forms. She scored a 14 on the anxiety; a 11 on the depression; a 3 on the bipolar and a 21/10 respectively on the ADD/ADHD form.  We discussed the results extensively and I have recommended that we start antianxiety medication.  Since she is having it all the time we will use a daily medication.    Review of Systems   All other systems reviewed and are negative.      Objective   Vitals:  /80   Pulse 97   Temp 37.7 °C (99.8 °F)   Resp 20   Wt 108 kg (237 lb)   LMP  (LMP Unknown)   SpO2 96%   BMI 41.98 kg/m²     Physical Exam  Vitals reviewed.   Constitutional:       Appearance: She is morbidly obese.   HENT:      Head: Normocephalic and atraumatic.      Right Ear: Tympanic membrane, ear canal and external ear normal.      Left Ear: Tympanic membrane, ear canal and external ear normal.      Nose: Nose normal.      Mouth/Throat:      Mouth: Mucous membranes are moist.      Pharynx: Oropharynx is clear.   Eyes:      Extraocular Movements: Extraocular movements intact.      Conjunctiva/sclera: Conjunctivae normal.      Pupils: Pupils are equal, round, and reactive to light.   Cardiovascular:      Rate and Rhythm: Normal rate and regular rhythm.      Heart sounds: Normal heart sounds. No murmur heard.  Pulmonary:      Effort: Pulmonary effort is normal.      Breath sounds: Normal breath sounds. No wheezing.   Abdominal:      General: Abdomen is  flat. Bowel sounds are normal.      Palpations: Abdomen is soft.   Musculoskeletal:         General: Normal range of motion.   Skin:     General: Skin is warm and dry.   Neurological:      General: No focal deficit present.      Mental Status: She is alert and oriented to person, place, and time. Mental status is at baseline.   Psychiatric:         Mood and Affect: Mood normal.         Behavior: Behavior normal.         Thought Content: Thought content normal.         Judgment: Judgment normal.         Assessment/Plan   Problem List Items Addressed This Visit       Anxiety    Relevant Medications    buPROPion (Wellbutrin) 75 mg tablet    Depression    Relevant Medications    buPROPion (Wellbutrin) 75 mg tablet     Other Visit Diagnoses       Morbid obesity with BMI of 40.0-44.9, adult (Multi)    -  Primary        Take new medication as directed.  Continue other medications as directed.  RTC in one month for recheck, sooner should problems arise.  Medication list reconciled.  Thank you for visiting today!      Professional services: Some of this note was completed using Dragon voice technology and sometimes the software misinterprets words. This may include unintended errors with respect to translation of words, typographical errors or grammar errors which may not have been identified prior to finalization of the chart note. Please take this into account when reading the note. Thank you.       Ronald Bradshaw,  08/28/24 8:28 AM

## 2024-09-16 DIAGNOSIS — J45.909 UNSPECIFIED ASTHMA, UNCOMPLICATED (HHS-HCC): ICD-10-CM

## 2024-09-16 DIAGNOSIS — K21.9 GASTROESOPHAGEAL REFLUX DISEASE WITHOUT ESOPHAGITIS: ICD-10-CM

## 2024-09-16 RX ORDER — MONTELUKAST SODIUM 10 MG/1
10 TABLET ORAL EVERY EVENING
Qty: 90 TABLET | Refills: 1 | Status: SHIPPED | OUTPATIENT
Start: 2024-09-16 | End: 2025-03-15

## 2024-09-16 RX ORDER — OMEPRAZOLE 20 MG/1
20 CAPSULE, DELAYED RELEASE ORAL DAILY
Qty: 90 CAPSULE | Refills: 1 | Status: SHIPPED | OUTPATIENT
Start: 2024-09-16 | End: 2025-03-15

## 2024-09-16 NOTE — TELEPHONE ENCOUNTER
Pt last OV 8/26/2024  Requested Prescriptions     Pending Prescriptions Disp Refills    montelukast (Singulair) 10 mg tablet [Pharmacy Med Name: MONTELUKAST SOD 10 MG TABLET] 90 tablet 1     Sig: Take 1 tablet (10 mg) by mouth once daily in the evening.    omeprazole (PriLOSEC) 20 mg DR capsule [Pharmacy Med Name: OMEPRAZOLE DR 20 MG CAPSULE] 90 capsule 1     Sig: Take 1 capsule (20 mg) by mouth once daily. Do not crush or chew.

## 2024-09-17 DIAGNOSIS — F41.9 ANXIETY: ICD-10-CM

## 2024-09-17 DIAGNOSIS — F32.A DEPRESSION, UNSPECIFIED DEPRESSION TYPE: ICD-10-CM

## 2024-09-17 RX ORDER — BUPROPION HYDROCHLORIDE 75 MG/1
75 TABLET ORAL 2 TIMES DAILY
Qty: 180 TABLET | Refills: 1 | Status: SHIPPED | OUTPATIENT
Start: 2024-09-17

## 2024-09-17 NOTE — TELEPHONE ENCOUNTER
Pt last OV 8/26/2024  Requested Prescriptions     Pending Prescriptions Disp Refills    buPROPion (Wellbutrin) 75 mg tablet [Pharmacy Med Name: BUPROPION HCL 75 MG TABLET] 180 tablet 1     Sig: TAKE 1 TABLET BY MOUTH TWICE A DAY

## 2024-09-26 ENCOUNTER — APPOINTMENT (OUTPATIENT)
Dept: PRIMARY CARE | Facility: CLINIC | Age: 24
End: 2024-09-26
Payer: COMMERCIAL

## 2024-09-26 VITALS
TEMPERATURE: 97.7 F | OXYGEN SATURATION: 97 % | WEIGHT: 234 LBS | SYSTOLIC BLOOD PRESSURE: 119 MMHG | DIASTOLIC BLOOD PRESSURE: 82 MMHG | BODY MASS INDEX: 41.45 KG/M2 | RESPIRATION RATE: 20 BRPM | HEART RATE: 93 BPM

## 2024-09-26 DIAGNOSIS — E66.01 MORBID OBESITY WITH BMI OF 40.0-44.9, ADULT (MULTI): ICD-10-CM

## 2024-09-26 DIAGNOSIS — F32.A DEPRESSION, UNSPECIFIED DEPRESSION TYPE: Primary | ICD-10-CM

## 2024-09-26 PROCEDURE — 99213 OFFICE O/P EST LOW 20 MIN: CPT | Performed by: FAMILY MEDICINE

## 2024-09-26 RX ORDER — BUPROPION HYDROCHLORIDE 300 MG/1
300 TABLET ORAL EVERY MORNING
Qty: 30 TABLET | Refills: 1 | Status: SHIPPED | OUTPATIENT
Start: 2024-09-26 | End: 2024-11-25

## 2024-09-26 NOTE — PROGRESS NOTES
Subjective   Patient ID: Xochitl Banuelos is a 23 y.o. female who presents for Follow-up (Wellbutrin fu. She has not n oticed an improvement in her mood and she has been having trouble sleeping. ).    HPI  Patient presents today for 1-month checkup and refill of meds. She states that she is taking her medicines as directed and is not having any side effects from them but they just do not seem to be working.  She states that she has not been sleeping well.  She admits that she has resumed her Topamax which she is getting through a weight loss center at [facility].  She is taking 50 mg daily and thus far she has lost 30 pounds since October 2023.    8/26/2024  Patient comes in today for follow-up on her fatigue and brain fog symptoms that she had last visit.  We did have her stop Topamax after that visit and she has not noticed any symptoms or increase in symptoms since stopping the Topamax but it has not helped the fatigue or brain fog either.  Patient given standard psych evaluation forms. She scored a 14 on the anxiety; a 11 on the depression; a 3 on the bipolar and a 21/10 respectively on the ADD/ADHD form.  We discussed the results extensively and I have recommended that we start antianxiety medication.  Since she is having it all the time we will use a daily medication.    Review of Systems   All other systems reviewed and are negative.      Objective   Vitals:  /82   Pulse 93   Temp 36.5 °C (97.7 °F)   Resp 20   Wt 106 kg (234 lb)   LMP  (LMP Unknown)   SpO2 97%   BMI 41.45 kg/m²     Physical Exam  Vitals reviewed.   Constitutional:       Appearance: She is morbidly obese.   HENT:      Head: Normocephalic and atraumatic.      Right Ear: Tympanic membrane, ear canal and external ear normal.      Left Ear: Tympanic membrane, ear canal and external ear normal.      Nose: Nose normal.      Mouth/Throat:      Mouth: Mucous membranes are moist.      Pharynx: Oropharynx is clear.   Eyes:      Extraocular  Movements: Extraocular movements intact.      Conjunctiva/sclera: Conjunctivae normal.      Pupils: Pupils are equal, round, and reactive to light.   Cardiovascular:      Rate and Rhythm: Normal rate and regular rhythm.      Heart sounds: Normal heart sounds. No murmur heard.  Pulmonary:      Effort: Pulmonary effort is normal.      Breath sounds: Normal breath sounds. No wheezing.   Abdominal:      General: Abdomen is flat. Bowel sounds are normal.      Palpations: Abdomen is soft.   Musculoskeletal:         General: Normal range of motion.   Skin:     General: Skin is warm and dry.   Neurological:      General: No focal deficit present.      Mental Status: She is alert and oriented to person, place, and time. Mental status is at baseline.   Psychiatric:         Mood and Affect: Mood normal.         Behavior: Behavior normal.         Thought Content: Thought content normal.         Judgment: Judgment normal.         Assessment/Plan   Problem List Items Addressed This Visit       Depression - Primary    Relevant Medications    buPROPion XL (Wellbutrin XL) 300 mg 24 hr tablet     Other Visit Diagnoses       Morbid obesity with BMI of 40.0-44.9, adult (Multi)            Take new dose of medication as directed.  Continue other medications as directed.  RTC in one month for recheck, sooner should problems arise.  Medication list reconciled.  Thank you for visiting today!      Professional services: Some of this note was completed using Dragon voice technology and sometimes the software misinterprets words. This may include unintended errors with respect to translation of words, typographical errors or grammar errors which may not have been identified prior to finalization of the chart note. Please take this into account when reading the note. Thank you.       Ronald Bradshaw DO 09/27/24 7:16 AM

## 2024-10-14 ENCOUNTER — PATIENT MESSAGE (OUTPATIENT)
Dept: PRIMARY CARE | Facility: CLINIC | Age: 24
End: 2024-10-14
Payer: COMMERCIAL

## 2024-10-14 DIAGNOSIS — F41.9 ANXIETY: Primary | ICD-10-CM

## 2024-10-14 DIAGNOSIS — J45.20 MILD INTERMITTENT ASTHMA, UNSPECIFIED WHETHER COMPLICATED (HHS-HCC): ICD-10-CM

## 2024-10-14 DIAGNOSIS — F32.A DEPRESSION, UNSPECIFIED DEPRESSION TYPE: ICD-10-CM

## 2024-10-15 RX ORDER — ALBUTEROL SULFATE 90 UG/1
1-2 INHALANT RESPIRATORY (INHALATION) EVERY 4 HOURS PRN
Qty: 18 G | Refills: 2 | Status: SHIPPED | OUTPATIENT
Start: 2024-10-15 | End: 2025-01-13

## 2024-10-15 NOTE — TELEPHONE ENCOUNTER
Pt last OV 9/26/2024  Requested Prescriptions     Pending Prescriptions Disp Refills    albuterol 90 mcg/actuation inhaler [Pharmacy Med Name: ALBUTEROL HFA (PROVENTIL) INH] 18 g 2     Sig: Inhale 1-2 puffs every 4 hours if needed for wheezing. Every 4 to 6 hours as needed as directed.

## 2024-10-18 RX ORDER — BUPROPION HYDROCHLORIDE 75 MG/1
75 TABLET ORAL DAILY
Qty: 3 TABLET | Refills: 0 | Status: SHIPPED | OUTPATIENT
Start: 2024-10-18 | End: 2024-10-21

## 2024-10-18 RX ORDER — BUPROPION HYDROCHLORIDE 150 MG/1
150 TABLET ORAL EVERY MORNING
Qty: 3 TABLET | Refills: 0 | Status: SHIPPED | OUTPATIENT
Start: 2024-10-18 | End: 2024-10-21

## 2024-10-22 DIAGNOSIS — F32.A DEPRESSION, UNSPECIFIED DEPRESSION TYPE: ICD-10-CM

## 2024-10-22 RX ORDER — BUPROPION HYDROCHLORIDE 300 MG/1
300 TABLET ORAL EVERY MORNING
Qty: 90 TABLET | Refills: 1 | OUTPATIENT
Start: 2024-10-22 | End: 2024-12-21

## 2024-10-28 ENCOUNTER — APPOINTMENT (OUTPATIENT)
Dept: PRIMARY CARE | Facility: CLINIC | Age: 24
End: 2024-10-28
Payer: COMMERCIAL

## 2024-10-28 VITALS
RESPIRATION RATE: 16 BRPM | TEMPERATURE: 97.7 F | WEIGHT: 234 LBS | HEART RATE: 96 BPM | BODY MASS INDEX: 41.45 KG/M2 | OXYGEN SATURATION: 98 % | DIASTOLIC BLOOD PRESSURE: 85 MMHG | SYSTOLIC BLOOD PRESSURE: 124 MMHG

## 2024-10-28 DIAGNOSIS — F41.9 ANXIETY DISORDER, UNSPECIFIED TYPE: Primary | ICD-10-CM

## 2024-10-28 PROCEDURE — 99213 OFFICE O/P EST LOW 20 MIN: CPT | Performed by: FAMILY MEDICINE

## 2024-10-28 RX ORDER — HYDROXYZINE PAMOATE 25 MG/1
CAPSULE ORAL
Qty: 90 CAPSULE | Refills: 0 | Status: SHIPPED | OUTPATIENT
Start: 2024-10-28

## 2024-11-20 DIAGNOSIS — F41.9 ANXIETY DISORDER, UNSPECIFIED TYPE: ICD-10-CM

## 2024-11-20 RX ORDER — HYDROXYZINE PAMOATE 25 MG/1
CAPSULE ORAL
Qty: 270 CAPSULE | Refills: 1 | Status: SHIPPED | OUTPATIENT
Start: 2024-11-20

## 2024-11-20 NOTE — TELEPHONE ENCOUNTER
Pharmacy requests prescription below. Please review- Pharmacy requesting 90 day supply. Unsure if this is appropriate at this time. Patients follow up is in 2 days    Last Office Visit: 10/28/2024   Next Office Visit: 11/22/2024     Requested Prescriptions     Pending Prescriptions Disp Refills    hydrOXYzine pamoate (Vistaril) 25 mg capsule [Pharmacy Med Name: HYDROXYZINE GILBERTO 25 MG CAP] 270 capsule 1     Sig: TAKE 1 CAPSULE BY MOUTH THREE TIMES A DAY AS NEEDED FOR ANXIETY F41.9

## 2024-11-22 ENCOUNTER — APPOINTMENT (OUTPATIENT)
Dept: PRIMARY CARE | Facility: CLINIC | Age: 24
End: 2024-11-22
Payer: COMMERCIAL

## 2024-11-22 VITALS
BODY MASS INDEX: 40.75 KG/M2 | HEIGHT: 63 IN | WEIGHT: 230 LBS | DIASTOLIC BLOOD PRESSURE: 80 MMHG | HEART RATE: 97 BPM | SYSTOLIC BLOOD PRESSURE: 124 MMHG | TEMPERATURE: 96.5 F | OXYGEN SATURATION: 99 % | RESPIRATION RATE: 16 BRPM

## 2024-11-22 DIAGNOSIS — F41.9 ANXIETY DISORDER, UNSPECIFIED TYPE: Primary | ICD-10-CM

## 2024-11-22 DIAGNOSIS — E66.01 MORBID OBESITY WITH BMI OF 40.0-44.9, ADULT (MULTI): ICD-10-CM

## 2024-11-22 PROCEDURE — 99213 OFFICE O/P EST LOW 20 MIN: CPT | Performed by: FAMILY MEDICINE

## 2024-11-22 NOTE — PROGRESS NOTES
"Subjective   Patient ID: Xochitl Banuelos is a 23 y.o. female who presents for Follow-up (1 month follow up for anxiety and start of Hydroxyzine. Pt states she is doing better and anxiety is improving daily. ).  HPI  Patient comes in today for follow-up on her anxiety.  She reports doing very well today.  Her panic attacks and anxiety have subsided significantly with the hydroxyzine.  She states she is taking just 25 mg at bedtime.    She also continues to do well with her weight loss, she has lost another 4 pounds since last month.  She has lost a total of 56 pounds from a year ago.    10/28/2024  Patient returns today for follow-up on her Wellbutrin medication.  She had significant problems with panic attacks with the Wellbutrin and so we weaned her off of it and she is feeling much better.  She has been off of it for about a week now.  However now that she is off of it her anxiety is back.  She is looking for something to help with anxiety but also something that will not interfere with her weight loss.  Right now she is using Topamax with good success and has lost 30 pounds since last year.    9/26/2024  Patient presents today for 1-month checkup and refill of meds. She states that she is taking her medicines as directed and is not having any side effects from them but they just do not seem to be working.  She states that she has not been sleeping well.  She admits that she has resumed her Topamax which she is getting through a weight loss center at [facility].  She is taking 50 mg daily and thus far she has lost 30 pounds since October 2023.      Review of Systems   All other systems reviewed and are negative.      Objective   Vitals:  /80   Pulse 97   Temp 35.8 °C (96.5 °F)   Resp 16   Ht 1.6 m (5' 3\")   Wt 104 kg (230 lb)   SpO2 99%   BMI 40.74 kg/m²     Physical Exam  Vitals reviewed.   Constitutional:       Appearance: She is morbidly obese.   HENT:      Head: Normocephalic and atraumatic.      " Right Ear: Tympanic membrane, ear canal and external ear normal.      Left Ear: Tympanic membrane, ear canal and external ear normal.      Nose: Nose normal.      Mouth/Throat:      Mouth: Mucous membranes are moist.      Pharynx: Oropharynx is clear.   Eyes:      Extraocular Movements: Extraocular movements intact.      Conjunctiva/sclera: Conjunctivae normal.      Pupils: Pupils are equal, round, and reactive to light.   Cardiovascular:      Rate and Rhythm: Normal rate and regular rhythm.      Heart sounds: Normal heart sounds. No murmur heard.  Pulmonary:      Effort: Pulmonary effort is normal.      Breath sounds: Normal breath sounds. No wheezing.   Abdominal:      General: Abdomen is flat. Bowel sounds are normal.      Palpations: Abdomen is soft.   Musculoskeletal:         General: Normal range of motion.   Skin:     General: Skin is warm and dry.   Neurological:      General: No focal deficit present.      Mental Status: She is alert and oriented to person, place, and time. Mental status is at baseline.   Psychiatric:         Mood and Affect: Mood normal.         Behavior: Behavior normal.         Thought Content: Thought content normal.         Judgment: Judgment normal.       Assessment/Plan   Problem List Items Addressed This Visit    None  Visit Diagnoses       Anxiety disorder, unspecified type    -  Primary    Morbid obesity with BMI of 40.0-44.9, adult (Multi)            Continue current meds as directed.  Follow up in 3 months for recheck if all remains stable, sooner if problems arise.  Medication list reconciled.  Thank you for visiting today!      Professional services: Some of this note was completed using Dragon voice technology and sometimes the software misinterprets words. This may include unintended errors with respect to translation of words, typographical errors or grammar errors which may not have been identified prior to finalization of the chart note. Please take this into account when  reading the note. Thank you.       Ronald Bradshaw DO 11/22/24 10:22 AM

## 2024-12-10 DIAGNOSIS — E03.9 HYPOTHYROIDISM, UNSPECIFIED TYPE: ICD-10-CM

## 2024-12-11 NOTE — TELEPHONE ENCOUNTER
Pt last OV 11/22/2024    Requested Prescriptions     Pending Prescriptions Disp Refills    levothyroxine (Synthroid, Levoxyl) 50 mcg tablet [Pharmacy Med Name: LEVOTHYROXINE 50 MCG TABLET] 90 tablet 1     Sig: Take 1 tablet (50 mcg) by mouth once daily.

## 2024-12-12 RX ORDER — LEVOTHYROXINE SODIUM 50 UG/1
50 TABLET ORAL DAILY
Qty: 90 TABLET | Refills: 1 | Status: SHIPPED | OUTPATIENT
Start: 2024-12-12 | End: 2025-06-10